# Patient Record
Sex: MALE | Race: BLACK OR AFRICAN AMERICAN | URBAN - METROPOLITAN AREA
[De-identification: names, ages, dates, MRNs, and addresses within clinical notes are randomized per-mention and may not be internally consistent; named-entity substitution may affect disease eponyms.]

---

## 2017-11-06 ENCOUNTER — APPOINTMENT (OUTPATIENT)
Dept: GENERAL RADIOLOGY | Facility: CLINIC | Age: 37
End: 2017-11-06
Attending: EMERGENCY MEDICINE
Payer: MEDICAID

## 2017-11-06 ENCOUNTER — APPOINTMENT (OUTPATIENT)
Dept: CARDIOLOGY | Facility: CLINIC | Age: 37
End: 2017-11-06
Attending: INTERNAL MEDICINE
Payer: MEDICAID

## 2017-11-06 ENCOUNTER — HOSPITAL ENCOUNTER (INPATIENT)
Facility: CLINIC | Age: 37
LOS: 2 days | Discharge: HOME | End: 2017-11-08
Attending: EMERGENCY MEDICINE | Admitting: INTERNAL MEDICINE
Payer: MEDICAID

## 2017-11-06 DIAGNOSIS — I25.10 CORONARY ARTERY DISEASE INVOLVING NATIVE CORONARY ARTERY OF NATIVE HEART WITHOUT ANGINA PECTORIS: ICD-10-CM

## 2017-11-06 DIAGNOSIS — I49.01 CARDIAC ARREST WITH VENTRICULAR FIBRILLATION (H): ICD-10-CM

## 2017-11-06 DIAGNOSIS — I21.11 ST ELEVATION MYOCARDIAL INFARCTION INVOLVING RIGHT CORONARY ARTERY (H): Primary | ICD-10-CM

## 2017-11-06 DIAGNOSIS — E78.5 HYPERLIPIDEMIA LDL GOAL <70: ICD-10-CM

## 2017-11-06 DIAGNOSIS — I46.9 CARDIAC ARREST WITH VENTRICULAR FIBRILLATION (H): ICD-10-CM

## 2017-11-06 LAB
ANION GAP SERPL CALCULATED.3IONS-SCNC: 14 MMOL/L (ref 3–14)
BASOPHILS # BLD AUTO: 0.1 10E9/L (ref 0–0.2)
BASOPHILS NFR BLD AUTO: 0.4 %
BUN SERPL-MCNC: 5 MG/DL (ref 7–30)
CALCIUM SERPL-MCNC: 8.6 MG/DL (ref 8.5–10.1)
CHLORIDE SERPL-SCNC: 106 MMOL/L (ref 94–109)
CHOLEST SERPL-MCNC: 220 MG/DL
CO2 SERPL-SCNC: 21 MMOL/L (ref 20–32)
CREAT SERPL-MCNC: 1.31 MG/DL (ref 0.66–1.25)
DIFFERENTIAL METHOD BLD: ABNORMAL
EOSINOPHIL # BLD AUTO: 0.1 10E9/L (ref 0–0.7)
EOSINOPHIL NFR BLD AUTO: 0.9 %
ERYTHROCYTE [DISTWIDTH] IN BLOOD BY AUTOMATED COUNT: 14.5 % (ref 10–15)
GFR SERPL CREATININE-BSD FRML MDRD: 62 ML/MIN/1.7M2
GLUCOSE BLDC GLUCOMTR-MCNC: 93 MG/DL (ref 70–99)
GLUCOSE BLDC GLUCOMTR-MCNC: 94 MG/DL (ref 70–99)
GLUCOSE SERPL-MCNC: 151 MG/DL (ref 70–99)
HCT VFR BLD AUTO: 49.3 % (ref 40–53)
HDLC SERPL-MCNC: 46 MG/DL
HGB BLD-MCNC: 16.9 G/DL (ref 13.3–17.7)
IMM GRANULOCYTES # BLD: 0 10E9/L (ref 0–0.4)
IMM GRANULOCYTES NFR BLD: 0.2 %
INTERPRETATION ECG - MUSE: NORMAL
KCT BLD-ACNC: 206 SEC (ref 105–167)
KCT BLD-ACNC: 230 SEC (ref 105–167)
LDLC SERPL CALC-MCNC: 137 MG/DL
LYMPHOCYTES # BLD AUTO: 2.5 10E9/L (ref 0.8–5.3)
LYMPHOCYTES NFR BLD AUTO: 20.5 %
MCH RBC QN AUTO: 29.8 PG (ref 26.5–33)
MCHC RBC AUTO-ENTMCNC: 34.3 G/DL (ref 31.5–36.5)
MCV RBC AUTO: 87 FL (ref 78–100)
MONOCYTES # BLD AUTO: 1.2 10E9/L (ref 0–1.3)
MONOCYTES NFR BLD AUTO: 9.7 %
NEUTROPHILS # BLD AUTO: 8.3 10E9/L (ref 1.6–8.3)
NEUTROPHILS NFR BLD AUTO: 68.3 %
NONHDLC SERPL-MCNC: 174 MG/DL
NRBC # BLD AUTO: 0 10*3/UL
NRBC BLD AUTO-RTO: 0 /100
PLATELET # BLD AUTO: 265 10E9/L (ref 150–450)
POTASSIUM SERPL-SCNC: 2.6 MMOL/L (ref 3.4–5.3)
POTASSIUM SERPL-SCNC: 4.1 MMOL/L (ref 3.4–5.3)
RBC # BLD AUTO: 5.67 10E12/L (ref 4.4–5.9)
SODIUM SERPL-SCNC: 141 MMOL/L (ref 133–144)
TRIGL SERPL-MCNC: 185 MG/DL
TROPONIN I SERPL-MCNC: 0.1 UG/L (ref 0–0.04)
TROPONIN I SERPL-MCNC: 1.67 UG/L (ref 0–0.04)
TROPONIN I SERPL-MCNC: 15.9 UG/L (ref 0–0.04)
WBC # BLD AUTO: 12.1 10E9/L (ref 4–11)

## 2017-11-06 PROCEDURE — 27210795 ZZH PAD DEFIB QUICK CR4

## 2017-11-06 PROCEDURE — 93458 L HRT ARTERY/VENTRICLE ANGIO: CPT | Mod: 26 | Performed by: INTERNAL MEDICINE

## 2017-11-06 PROCEDURE — 37799 UNLISTED PX VASCULAR SURGERY: CPT

## 2017-11-06 PROCEDURE — C1769 GUIDE WIRE: HCPCS

## 2017-11-06 PROCEDURE — C1874 STENT, COATED/COV W/DEL SYS: HCPCS

## 2017-11-06 PROCEDURE — B2151ZZ FLUOROSCOPY OF LEFT HEART USING LOW OSMOLAR CONTRAST: ICD-10-PCS | Performed by: INTERNAL MEDICINE

## 2017-11-06 PROCEDURE — 27210787 ZZH MANIFOLD CR2

## 2017-11-06 PROCEDURE — 27211089 ZZH KIT ACIST INJECTOR CR3

## 2017-11-06 PROCEDURE — 27211117 ZZH CARDIOVERT/DEFIB/PACER SUPP

## 2017-11-06 PROCEDURE — B2111ZZ FLUOROSCOPY OF MULTIPLE CORONARY ARTERIES USING LOW OSMOLAR CONTRAST: ICD-10-PCS | Performed by: INTERNAL MEDICINE

## 2017-11-06 PROCEDURE — 85025 COMPLETE CBC W/AUTO DIFF WBC: CPT | Performed by: EMERGENCY MEDICINE

## 2017-11-06 PROCEDURE — 36415 COLL VENOUS BLD VENIPUNCTURE: CPT | Performed by: INTERNAL MEDICINE

## 2017-11-06 PROCEDURE — 84484 ASSAY OF TROPONIN QUANT: CPT | Performed by: EMERGENCY MEDICINE

## 2017-11-06 PROCEDURE — 84132 ASSAY OF SERUM POTASSIUM: CPT | Performed by: INTERNAL MEDICINE

## 2017-11-06 PROCEDURE — 93010 ELECTROCARDIOGRAM REPORT: CPT | Mod: 76 | Performed by: INTERNAL MEDICINE

## 2017-11-06 PROCEDURE — C1887 CATHETER, GUIDING: HCPCS

## 2017-11-06 PROCEDURE — 92950 HEART/LUNG RESUSCITATION CPR: CPT

## 2017-11-06 PROCEDURE — 25000125 ZZHC RX 250: Performed by: INTERNAL MEDICINE

## 2017-11-06 PROCEDURE — 85347 COAGULATION TIME ACTIVATED: CPT

## 2017-11-06 PROCEDURE — 027034Z DILATION OF CORONARY ARTERY, ONE ARTERY WITH DRUG-ELUTING INTRALUMINAL DEVICE, PERCUTANEOUS APPROACH: ICD-10-PCS | Performed by: INTERNAL MEDICINE

## 2017-11-06 PROCEDURE — 80061 LIPID PANEL: CPT | Performed by: EMERGENCY MEDICINE

## 2017-11-06 PROCEDURE — 27210946 ZZH KIT HC TOTES DISP CR8

## 2017-11-06 PROCEDURE — 27210759 ZZH DEVICE INFLATION CR6

## 2017-11-06 PROCEDURE — 93005 ELECTROCARDIOGRAM TRACING: CPT

## 2017-11-06 PROCEDURE — 6A4Z1ZZ HYPOTHERMIA, MULTIPLE: ICD-10-PCS | Performed by: INTERNAL MEDICINE

## 2017-11-06 PROCEDURE — 25000128 H RX IP 250 OP 636: Performed by: INTERNAL MEDICINE

## 2017-11-06 PROCEDURE — 25000132 ZZH RX MED GY IP 250 OP 250 PS 637: Performed by: INTERNAL MEDICINE

## 2017-11-06 PROCEDURE — 93458 L HRT ARTERY/VENTRICLE ANGIO: CPT

## 2017-11-06 PROCEDURE — C9600 PERC DRUG-EL COR STENT SING: HCPCS | Mod: RC

## 2017-11-06 PROCEDURE — 71010 XR CHEST PORT 1 VW: CPT

## 2017-11-06 PROCEDURE — 99223 1ST HOSP IP/OBS HIGH 75: CPT | Mod: 25 | Performed by: INTERNAL MEDICINE

## 2017-11-06 PROCEDURE — C9460 INJECTION, CANGRELOR: HCPCS | Performed by: INTERNAL MEDICINE

## 2017-11-06 PROCEDURE — 84484 ASSAY OF TROPONIN QUANT: CPT | Performed by: INTERNAL MEDICINE

## 2017-11-06 PROCEDURE — 92941 PRQ TRLML REVSC TOT OCCL AMI: CPT | Mod: RC | Performed by: INTERNAL MEDICINE

## 2017-11-06 PROCEDURE — 99233 SBSQ HOSP IP/OBS HIGH 50: CPT | Performed by: ANESTHESIOLOGY

## 2017-11-06 PROCEDURE — 25000128 H RX IP 250 OP 636: Performed by: EMERGENCY MEDICINE

## 2017-11-06 PROCEDURE — 99285 EMERGENCY DEPT VISIT HI MDM: CPT | Mod: 25

## 2017-11-06 PROCEDURE — 20000003 ZZH R&B ICU

## 2017-11-06 PROCEDURE — 27211163 ZZH HYPOTHERMIC CATHETER/KIT CR19

## 2017-11-06 PROCEDURE — 4A023N7 MEASUREMENT OF CARDIAC SAMPLING AND PRESSURE, LEFT HEART, PERCUTANEOUS APPROACH: ICD-10-PCS | Performed by: INTERNAL MEDICINE

## 2017-11-06 PROCEDURE — 00000146 ZZHCL STATISTIC GLUCOSE BY METER IP

## 2017-11-06 PROCEDURE — 80048 BASIC METABOLIC PNL TOTAL CA: CPT | Performed by: EMERGENCY MEDICINE

## 2017-11-06 RX ORDER — ADENOSINE 3 MG/ML
12-12000 INJECTION, SOLUTION INTRAVENOUS
Status: DISCONTINUED | OUTPATIENT
Start: 2017-11-06 | End: 2017-11-06 | Stop reason: HOSPADM

## 2017-11-06 RX ORDER — BUPIVACAINE HYDROCHLORIDE 2.5 MG/ML
1-10 INJECTION, SOLUTION EPIDURAL; INFILTRATION; INTRACAUDAL
Status: DISCONTINUED | OUTPATIENT
Start: 2017-11-06 | End: 2017-11-06 | Stop reason: HOSPADM

## 2017-11-06 RX ORDER — METOPROLOL TARTRATE 1 MG/ML
5 INJECTION, SOLUTION INTRAVENOUS EVERY 5 MIN PRN
Status: DISCONTINUED | OUTPATIENT
Start: 2017-11-06 | End: 2017-11-06 | Stop reason: HOSPADM

## 2017-11-06 RX ORDER — METOPROLOL TARTRATE 25 MG/1
25 TABLET, FILM COATED ORAL 2 TIMES DAILY
Status: DISCONTINUED | OUTPATIENT
Start: 2017-11-06 | End: 2017-11-08 | Stop reason: HOSPADM

## 2017-11-06 RX ORDER — LIDOCAINE 40 MG/G
CREAM TOPICAL
Status: DISCONTINUED | OUTPATIENT
Start: 2017-11-06 | End: 2017-11-08 | Stop reason: HOSPADM

## 2017-11-06 RX ORDER — FUROSEMIDE 10 MG/ML
20-100 INJECTION INTRAMUSCULAR; INTRAVENOUS
Status: DISCONTINUED | OUTPATIENT
Start: 2017-11-06 | End: 2017-11-06 | Stop reason: HOSPADM

## 2017-11-06 RX ORDER — LIDOCAINE HYDROCHLORIDE 10 MG/ML
1-10 INJECTION, SOLUTION EPIDURAL; INFILTRATION; INTRACAUDAL; PERINEURAL
Status: COMPLETED | OUTPATIENT
Start: 2017-11-06 | End: 2017-11-06

## 2017-11-06 RX ORDER — NITROGLYCERIN 0.4 MG/1
0.4 TABLET SUBLINGUAL EVERY 5 MIN PRN
Status: DISCONTINUED | OUTPATIENT
Start: 2017-11-06 | End: 2017-11-06 | Stop reason: HOSPADM

## 2017-11-06 RX ORDER — ATORVASTATIN CALCIUM 40 MG/1
40 TABLET, FILM COATED ORAL DAILY
Status: DISCONTINUED | OUTPATIENT
Start: 2017-11-06 | End: 2017-11-08 | Stop reason: HOSPADM

## 2017-11-06 RX ORDER — HEPARIN SODIUM 1000 [USP'U]/ML
1000-10000 INJECTION, SOLUTION INTRAVENOUS; SUBCUTANEOUS EVERY 5 MIN PRN
Status: DISCONTINUED | OUTPATIENT
Start: 2017-11-06 | End: 2017-11-06 | Stop reason: HOSPADM

## 2017-11-06 RX ORDER — NICARDIPINE HYDROCHLORIDE 2.5 MG/ML
100 INJECTION INTRAVENOUS
Status: DISCONTINUED | OUTPATIENT
Start: 2017-11-06 | End: 2017-11-06 | Stop reason: HOSPADM

## 2017-11-06 RX ORDER — FENTANYL CITRATE 50 UG/ML
25-50 INJECTION, SOLUTION INTRAMUSCULAR; INTRAVENOUS
Status: DISPENSED | OUTPATIENT
Start: 2017-11-06 | End: 2017-11-07

## 2017-11-06 RX ORDER — SODIUM NITROPRUSSIDE 25 MG/ML
100-200 INJECTION INTRAVENOUS
Status: DISCONTINUED | OUTPATIENT
Start: 2017-11-06 | End: 2017-11-06 | Stop reason: HOSPADM

## 2017-11-06 RX ORDER — LORAZEPAM 0.5 MG/1
0.5 TABLET ORAL
Status: DISCONTINUED | OUTPATIENT
Start: 2017-11-06 | End: 2017-11-06 | Stop reason: HOSPADM

## 2017-11-06 RX ORDER — LIDOCAINE 40 MG/G
CREAM TOPICAL
Status: DISCONTINUED | OUTPATIENT
Start: 2017-11-06 | End: 2017-11-06 | Stop reason: HOSPADM

## 2017-11-06 RX ORDER — SODIUM CHLORIDE 9 MG/ML
INJECTION, SOLUTION INTRAVENOUS CONTINUOUS
Status: DISCONTINUED | OUTPATIENT
Start: 2017-11-06 | End: 2017-11-06 | Stop reason: HOSPADM

## 2017-11-06 RX ORDER — NALOXONE HYDROCHLORIDE 0.4 MG/ML
.1-.4 INJECTION, SOLUTION INTRAMUSCULAR; INTRAVENOUS; SUBCUTANEOUS
Status: DISCONTINUED | OUTPATIENT
Start: 2017-11-06 | End: 2017-11-08 | Stop reason: HOSPADM

## 2017-11-06 RX ORDER — ATROPINE SULFATE 0.1 MG/ML
0.5 INJECTION INTRAVENOUS EVERY 5 MIN PRN
Status: ACTIVE | OUTPATIENT
Start: 2017-11-06 | End: 2017-11-07

## 2017-11-06 RX ORDER — ASPIRIN 81 MG/1
81 TABLET ORAL DAILY
Status: DISCONTINUED | OUTPATIENT
Start: 2017-11-07 | End: 2017-11-08 | Stop reason: HOSPADM

## 2017-11-06 RX ORDER — EPINEPHRINE 1 MG/ML
0.3 INJECTION, SOLUTION, CONCENTRATE INTRAVENOUS
Status: DISCONTINUED | OUTPATIENT
Start: 2017-11-06 | End: 2017-11-06 | Stop reason: HOSPADM

## 2017-11-06 RX ORDER — FLUMAZENIL 0.1 MG/ML
0.2 INJECTION, SOLUTION INTRAVENOUS
Status: DISCONTINUED | OUTPATIENT
Start: 2017-11-06 | End: 2017-11-06 | Stop reason: HOSPADM

## 2017-11-06 RX ORDER — FLUMAZENIL 0.1 MG/ML
0.2 INJECTION, SOLUTION INTRAVENOUS
Status: ACTIVE | OUTPATIENT
Start: 2017-11-06 | End: 2017-11-07

## 2017-11-06 RX ORDER — ONDANSETRON 2 MG/ML
4 INJECTION INTRAMUSCULAR; INTRAVENOUS EVERY 4 HOURS PRN
Status: DISCONTINUED | OUTPATIENT
Start: 2017-11-06 | End: 2017-11-06 | Stop reason: HOSPADM

## 2017-11-06 RX ORDER — ASPIRIN 81 MG/1
81-324 TABLET, CHEWABLE ORAL
Status: DISCONTINUED | OUTPATIENT
Start: 2017-11-06 | End: 2017-11-06 | Stop reason: HOSPADM

## 2017-11-06 RX ORDER — HYDRALAZINE HYDROCHLORIDE 20 MG/ML
10-20 INJECTION INTRAMUSCULAR; INTRAVENOUS
Status: DISCONTINUED | OUTPATIENT
Start: 2017-11-06 | End: 2017-11-06 | Stop reason: HOSPADM

## 2017-11-06 RX ORDER — ENALAPRILAT 1.25 MG/ML
1.25-2.5 INJECTION INTRAVENOUS
Status: DISCONTINUED | OUTPATIENT
Start: 2017-11-06 | End: 2017-11-06 | Stop reason: HOSPADM

## 2017-11-06 RX ORDER — DIAZEPAM 5 MG
5 TABLET ORAL
Status: CANCELLED | OUTPATIENT
Start: 2017-11-06

## 2017-11-06 RX ORDER — NITROGLYCERIN 20 MG/100ML
.07-2 INJECTION INTRAVENOUS CONTINUOUS PRN
Status: DISCONTINUED | OUTPATIENT
Start: 2017-11-06 | End: 2017-11-06 | Stop reason: HOSPADM

## 2017-11-06 RX ORDER — MORPHINE SULFATE 2 MG/ML
1-2 INJECTION, SOLUTION INTRAMUSCULAR; INTRAVENOUS EVERY 5 MIN PRN
Status: DISCONTINUED | OUTPATIENT
Start: 2017-11-06 | End: 2017-11-06 | Stop reason: HOSPADM

## 2017-11-06 RX ORDER — ATROPINE SULFATE 0.1 MG/ML
.5-1 INJECTION INTRAVENOUS
Status: DISCONTINUED | OUTPATIENT
Start: 2017-11-06 | End: 2017-11-06 | Stop reason: HOSPADM

## 2017-11-06 RX ORDER — PHENYLEPHRINE HCL IN 0.9% NACL 1 MG/10 ML
20-100 SYRINGE (ML) INTRAVENOUS
Status: DISCONTINUED | OUTPATIENT
Start: 2017-11-06 | End: 2017-11-06 | Stop reason: HOSPADM

## 2017-11-06 RX ORDER — DEXTROSE MONOHYDRATE 25 G/50ML
12.5-5 INJECTION, SOLUTION INTRAVENOUS EVERY 30 MIN PRN
Status: DISCONTINUED | OUTPATIENT
Start: 2017-11-06 | End: 2017-11-06 | Stop reason: HOSPADM

## 2017-11-06 RX ORDER — FENTANYL CITRATE 50 UG/ML
25-50 INJECTION, SOLUTION INTRAMUSCULAR; INTRAVENOUS
Status: DISCONTINUED | OUTPATIENT
Start: 2017-11-06 | End: 2017-11-06 | Stop reason: HOSPADM

## 2017-11-06 RX ORDER — PROTAMINE SULFATE 10 MG/ML
1-5 INJECTION, SOLUTION INTRAVENOUS
Status: DISCONTINUED | OUTPATIENT
Start: 2017-11-06 | End: 2017-11-06 | Stop reason: HOSPADM

## 2017-11-06 RX ORDER — NALOXONE HYDROCHLORIDE 0.4 MG/ML
.2-.4 INJECTION, SOLUTION INTRAMUSCULAR; INTRAVENOUS; SUBCUTANEOUS
Status: ACTIVE | OUTPATIENT
Start: 2017-11-06 | End: 2017-11-07

## 2017-11-06 RX ORDER — PROMETHAZINE HYDROCHLORIDE 25 MG/ML
6.25-25 INJECTION, SOLUTION INTRAMUSCULAR; INTRAVENOUS EVERY 4 HOURS PRN
Status: DISCONTINUED | OUTPATIENT
Start: 2017-11-06 | End: 2017-11-06 | Stop reason: HOSPADM

## 2017-11-06 RX ORDER — LIDOCAINE HYDROCHLORIDE 10 MG/ML
30 INJECTION, SOLUTION EPIDURAL; INFILTRATION; INTRACAUDAL; PERINEURAL
Status: DISCONTINUED | OUTPATIENT
Start: 2017-11-06 | End: 2017-11-06 | Stop reason: HOSPADM

## 2017-11-06 RX ORDER — NITROGLYCERIN 5 MG/ML
100-200 VIAL (ML) INTRAVENOUS
Status: DISCONTINUED | OUTPATIENT
Start: 2017-11-06 | End: 2017-11-06 | Stop reason: HOSPADM

## 2017-11-06 RX ORDER — POTASSIUM CHLORIDE 7.45 MG/ML
10 INJECTION INTRAVENOUS
Status: DISCONTINUED | OUTPATIENT
Start: 2017-11-06 | End: 2017-11-06 | Stop reason: HOSPADM

## 2017-11-06 RX ORDER — LORAZEPAM 2 MG/ML
0.5 INJECTION INTRAMUSCULAR
Status: DISCONTINUED | OUTPATIENT
Start: 2017-11-06 | End: 2017-11-06 | Stop reason: HOSPADM

## 2017-11-06 RX ORDER — DOPAMINE HYDROCHLORIDE 160 MG/100ML
2-20 INJECTION, SOLUTION INTRAVENOUS CONTINUOUS PRN
Status: DISCONTINUED | OUTPATIENT
Start: 2017-11-06 | End: 2017-11-06 | Stop reason: HOSPADM

## 2017-11-06 RX ORDER — NITROGLYCERIN 5 MG/ML
100-500 VIAL (ML) INTRAVENOUS
Status: DISCONTINUED | OUTPATIENT
Start: 2017-11-06 | End: 2017-11-06 | Stop reason: HOSPADM

## 2017-11-06 RX ORDER — HEPARIN SODIUM 1000 [USP'U]/ML
4000 INJECTION, SOLUTION INTRAVENOUS; SUBCUTANEOUS ONCE
Status: COMPLETED | OUTPATIENT
Start: 2017-11-06 | End: 2017-11-06

## 2017-11-06 RX ORDER — HEPARIN SODIUM 1000 [USP'U]/ML
INJECTION, SOLUTION INTRAVENOUS; SUBCUTANEOUS
Status: DISCONTINUED
Start: 2017-11-06 | End: 2017-11-06 | Stop reason: HOSPADM

## 2017-11-06 RX ORDER — METHYLPREDNISOLONE SODIUM SUCCINATE 125 MG/2ML
125 INJECTION, POWDER, LYOPHILIZED, FOR SOLUTION INTRAMUSCULAR; INTRAVENOUS
Status: DISCONTINUED | OUTPATIENT
Start: 2017-11-06 | End: 2017-11-06 | Stop reason: HOSPADM

## 2017-11-06 RX ORDER — DIPHENHYDRAMINE HYDROCHLORIDE 50 MG/ML
25-50 INJECTION INTRAMUSCULAR; INTRAVENOUS
Status: DISCONTINUED | OUTPATIENT
Start: 2017-11-06 | End: 2017-11-06 | Stop reason: HOSPADM

## 2017-11-06 RX ORDER — NALOXONE HYDROCHLORIDE 0.4 MG/ML
0.4 INJECTION, SOLUTION INTRAMUSCULAR; INTRAVENOUS; SUBCUTANEOUS EVERY 5 MIN PRN
Status: DISCONTINUED | OUTPATIENT
Start: 2017-11-06 | End: 2017-11-06 | Stop reason: HOSPADM

## 2017-11-06 RX ORDER — ASPIRIN 325 MG
325 TABLET ORAL
Status: DISCONTINUED | OUTPATIENT
Start: 2017-11-06 | End: 2017-11-06 | Stop reason: HOSPADM

## 2017-11-06 RX ORDER — DOBUTAMINE HYDROCHLORIDE 200 MG/100ML
2-20 INJECTION INTRAVENOUS CONTINUOUS PRN
Status: DISCONTINUED | OUTPATIENT
Start: 2017-11-06 | End: 2017-11-06 | Stop reason: HOSPADM

## 2017-11-06 RX ORDER — HYDROCODONE BITARTRATE AND ACETAMINOPHEN 5; 325 MG/1; MG/1
1-2 TABLET ORAL EVERY 4 HOURS PRN
Status: DISCONTINUED | OUTPATIENT
Start: 2017-11-06 | End: 2017-11-08 | Stop reason: HOSPADM

## 2017-11-06 RX ORDER — ACETAMINOPHEN 325 MG/1
325-650 TABLET ORAL EVERY 4 HOURS PRN
Status: DISCONTINUED | OUTPATIENT
Start: 2017-11-06 | End: 2017-11-08 | Stop reason: HOSPADM

## 2017-11-06 RX ORDER — NIFEDIPINE 10 MG/1
10 CAPSULE ORAL
Status: DISCONTINUED | OUTPATIENT
Start: 2017-11-06 | End: 2017-11-06 | Stop reason: HOSPADM

## 2017-11-06 RX ORDER — PRASUGREL 10 MG/1
10-60 TABLET, FILM COATED ORAL
Status: DISCONTINUED | OUTPATIENT
Start: 2017-11-06 | End: 2017-11-06 | Stop reason: HOSPADM

## 2017-11-06 RX ORDER — POTASSIUM CHLORIDE 29.8 MG/ML
20 INJECTION INTRAVENOUS
Status: COMPLETED | OUTPATIENT
Start: 2017-11-06 | End: 2017-11-06

## 2017-11-06 RX ORDER — POTASSIUM CHLORIDE 1500 MG/1
20 TABLET, EXTENDED RELEASE ORAL
Status: DISCONTINUED | OUTPATIENT
Start: 2017-11-06 | End: 2017-11-06 | Stop reason: HOSPADM

## 2017-11-06 RX ORDER — NITROGLYCERIN 0.4 MG/1
0.4 TABLET SUBLINGUAL EVERY 5 MIN PRN
Status: DISCONTINUED | OUTPATIENT
Start: 2017-11-06 | End: 2017-11-08 | Stop reason: HOSPADM

## 2017-11-06 RX ORDER — SODIUM CHLORIDE 9 MG/ML
INJECTION, SOLUTION INTRAVENOUS CONTINUOUS
Status: ACTIVE | OUTPATIENT
Start: 2017-11-06 | End: 2017-11-06

## 2017-11-06 RX ORDER — PROTAMINE SULFATE 10 MG/ML
25-100 INJECTION, SOLUTION INTRAVENOUS EVERY 5 MIN PRN
Status: DISCONTINUED | OUTPATIENT
Start: 2017-11-06 | End: 2017-11-06 | Stop reason: HOSPADM

## 2017-11-06 RX ORDER — CLOPIDOGREL BISULFATE 75 MG/1
75 TABLET ORAL
Status: DISCONTINUED | OUTPATIENT
Start: 2017-11-06 | End: 2017-11-06 | Stop reason: HOSPADM

## 2017-11-06 RX ORDER — VERAPAMIL HYDROCHLORIDE 2.5 MG/ML
1-2.5 INJECTION, SOLUTION INTRAVENOUS
Status: DISCONTINUED | OUTPATIENT
Start: 2017-11-06 | End: 2017-11-06 | Stop reason: HOSPADM

## 2017-11-06 RX ORDER — CLOPIDOGREL 300 MG/1
300-600 TABLET, FILM COATED ORAL
Status: DISCONTINUED | OUTPATIENT
Start: 2017-11-06 | End: 2017-11-06 | Stop reason: HOSPADM

## 2017-11-06 RX ORDER — POTASSIUM CHLORIDE 7.45 MG/ML
10 INJECTION INTRAVENOUS
Status: COMPLETED | OUTPATIENT
Start: 2017-11-06 | End: 2017-11-06

## 2017-11-06 RX ORDER — LORAZEPAM 2 MG/ML
.5-2 INJECTION INTRAMUSCULAR EVERY 4 HOURS PRN
Status: DISCONTINUED | OUTPATIENT
Start: 2017-11-06 | End: 2017-11-06 | Stop reason: HOSPADM

## 2017-11-06 RX ADMIN — ATORVASTATIN CALCIUM 40 MG: 40 TABLET, FILM COATED ORAL at 17:34

## 2017-11-06 RX ADMIN — TICAGRELOR 180 MG: 90 TABLET ORAL at 14:26

## 2017-11-06 RX ADMIN — MIDAZOLAM HYDROCHLORIDE 4 MG: 1 INJECTION, SOLUTION INTRAMUSCULAR; INTRAVENOUS at 12:43

## 2017-11-06 RX ADMIN — Medication 4000 UNITS: at 11:59

## 2017-11-06 RX ADMIN — Medication 2000 UNITS: at 13:01

## 2017-11-06 RX ADMIN — SODIUM CHLORIDE: 9 INJECTION, SOLUTION INTRAVENOUS at 14:11

## 2017-11-06 RX ADMIN — MIDAZOLAM HYDROCHLORIDE 2 MG: 1 INJECTION, SOLUTION INTRAMUSCULAR; INTRAVENOUS at 16:16

## 2017-11-06 RX ADMIN — POTASSIUM CHLORIDE 20 MEQ: 400 INJECTION, SOLUTION INTRAVENOUS at 13:00

## 2017-11-06 RX ADMIN — LIDOCAINE HYDROCHLORIDE 10 MG: 10 INJECTION, SOLUTION EPIDURAL; INFILTRATION; INTRACAUDAL; PERINEURAL at 12:09

## 2017-11-06 RX ADMIN — POTASSIUM CHLORIDE 10 MEQ: 7.46 INJECTION, SOLUTION INTRAVENOUS at 12:44

## 2017-11-06 RX ADMIN — CANGRELOR 4 MCG/KG/MIN: 50 INJECTION, POWDER, LYOPHILIZED, FOR SOLUTION INTRAVENOUS at 12:35

## 2017-11-06 RX ADMIN — MIDAZOLAM HYDROCHLORIDE 2 MG: 1 INJECTION, SOLUTION INTRAMUSCULAR; INTRAVENOUS at 12:08

## 2017-11-06 RX ADMIN — NITROGLYCERIN 0.4 MG: 0.4 TABLET SUBLINGUAL at 14:50

## 2017-11-06 RX ADMIN — Medication 3000 UNITS: at 12:45

## 2017-11-06 RX ADMIN — TICAGRELOR 90 MG: 90 TABLET ORAL at 21:42

## 2017-11-06 RX ADMIN — FENTANYL CITRATE 25 MCG: 50 INJECTION, SOLUTION INTRAMUSCULAR; INTRAVENOUS at 16:16

## 2017-11-06 ASSESSMENT — ACTIVITIES OF DAILY LIVING (ADL)
TOILETING: 0-->INDEPENDENT
DRESS: 0-->INDEPENDENT
RETIRED_EATING: 0-->INDEPENDENT
FALL_HISTORY_WITHIN_LAST_SIX_MONTHS: NO
AMBULATION: 0-->INDEPENDENT
RETIRED_COMMUNICATION: 0-->UNDERSTANDS/COMMUNICATES WITHOUT DIFFICULTY
BATHING: 0-->INDEPENDENT
COGNITION: 0 - NO COGNITION ISSUES REPORTED
TRANSFERRING: 0-->INDEPENDENT
SWALLOWING: 0-->SWALLOWS FOODS/LIQUIDS WITHOUT DIFFICULTY

## 2017-11-06 ASSESSMENT — ENCOUNTER SYMPTOMS
CHEST TIGHTNESS: 1
FATIGUE: 0

## 2017-11-06 NOTE — IP AVS SNAPSHOT
[facility]    [address] [place] [address], [address]    [place] [address]    Phone:  [phone]                                       After Visit Summary   11/6/2017    Clark Canales    MRN: [id number]           After Visit Summary Signature Page     I have received my discharge instructions, and my questions have been answered. I have discussed any challenges I see with this plan with the nurse or doctor.    ..........................................................................................................................................  Patient/Patient Representative Signature      ..........................................................................................................................................  Patient Representative Print Name and Relationship to Patient    ..................................................               ................................................  Date                                            Time    ..........................................................................................................................................  Reviewed by Signature/Title    ...................................................              ..............................................  Date                                                            Time

## 2017-11-06 NOTE — PROGRESS NOTES
Patient seen in ICU. Completely awake and responding appropriately. Recalls chest pain while working but nothing else. Has risk factors of family history and tobacco abuse. Plan for CMR and cardiac rehab tomorrow.

## 2017-11-06 NOTE — CONSULTS
Cardiology Consultation      Clark Canales MRN# [id number]   YOB: 1980 Age: 36 year old   Date of Admission: 2017     Reason for consult:            Assessment and Plan:     1) VF arrest now in NSR  2) Inferior STEMI    Plan  - coronary angio. Patient is not awake enough to provide consent but I feel it is medically necessary to proceed             Chief Complaint:   Cardiac Arrest (pt at work with CP given asa and ntg at scene vfib in rig shocked  x 1 return of pulse, pt arrived obtuned with spont resp, ST elevation)           History of Present Illness:   This patient is a 36 year old male who was at work today when he started experiencing anterior SS chest discomfort. EMS were contacted and gave him NTG and ASA after which he had VF requiring a shock x 1. Post shock EKG demonstrated inferior ST elevation.  Post shock the patient appeared drowsy but was  Responsive to questions. He did complain of chest pain. Prior to the arrest he denied any significant cardiac history or meds.         Physical Exam:   Vitals were reviewed  Blood pressure 113/79, temperature 97  F (36.1  C), temperature source Temporal, resp. rate 18, weight 102.5 kg (225 lb 15.5 oz), SpO2 98 %.  Temperatures:  Current - Temp: 97  F (36.1  C); Max - Temp  Av  F (36.1  C)  Min: 97  F (36.1  C)  Max: 97  F (36.1  C)  Respiration range: Resp  Av  Min: 18  Max: 18  Pulse range: No Data Recorded  Blood pressure range: Systolic (24hrs), Av , Min:113 , Max:113   ; Diastolic (24hrs), Av, Min:79, Max:79    Pulse oximetry range: SpO2  Av %  Min: 98 %  Max: 98 %  No intake or output data in the 24 hours ending 17 1156  Constitutional:   Drowsy but responsive     Eyes:   Lids and lashes normal, pupils equal, round and reactive to light, extra ocular muscles intact, sclera clear, conjunctiva normal     Neck:   supple, symmetrical, trachea midline, no JVD     Back:   symmetric     Lungs:   No increased work of  breathing, good air exchange, clear to auscultation bilaterally, no crackles or wheezing     Cardiovascular:   Normal apical impulse, regular rate and rhythm, normal S1 and S2, no S3 or S4, and no murmur noted.      Abdomen:   non-tender     Musculoskeletal:   motor strength is 5 out of 5 all extremities bilaterally     Neurologic:   Grossly nonfocal     Skin:   no bruising or bleeding     Additional findings:     No edema               Past Medical History:   I have reviewed this patient's past medical history    No CAD            Past Surgical History:   I have reviewed this patient's past surgical history  No past surgical history on file.            Social History:   I have reviewed this patient's social history  Social History   Substance Use Topics     Smoking status: Not on file     Smokeless tobacco: Not on file     Alcohol use Not on file             Family History:   I have reviewed this patient's family history    FH +matthew for CAD          Allergies:   No Known Allergies          Medications:   I have reviewed this patient's current medications    (Not in a hospital admission)  Current Facility-Administered Medications Ordered in Epic   Medication Dose Route Frequency Last Rate Last Dose     heparin (porcine) 1000 UNIT/ML injection             No current Epic-ordered outpatient prescriptions on file.             Review of Systems:   The 10 point Review of Systems is negative other than noted in the HPI            Data:   All laboratory data reviewed  Results for orders placed or performed during the hospital encounter of 11/06/17 (from the past 24 hour(s))   CBC with platelets differential   Result Value Ref Range    WBC 12.1 (H) 4.0 - 11.0 10e9/L    RBC Count 5.67 4.4 - 5.9 10e12/L    Hemoglobin 16.9 13.3 - 17.7 g/dL    Hematocrit 49.3 40.0 - 53.0 %    MCV 87 78 - 100 fl    MCH 29.8 26.5 - 33.0 pg    MCHC 34.3 31.5 - 36.5 g/dL    RDW 14.5 10.0 - 15.0 %    Platelet Count 265 150 - 450 10e9/L    Diff Method  Automated Method     % Neutrophils 68.3 %    % Lymphocytes 20.5 %    % Monocytes 9.7 %    % Eosinophils 0.9 %    % Basophils 0.4 %    % Immature Granulocytes 0.2 %    Nucleated RBCs 0 0 /100    Absolute Neutrophil 8.3 1.6 - 8.3 10e9/L    Absolute Lymphocytes 2.5 0.8 - 5.3 10e9/L    Absolute Monocytes 1.2 0.0 - 1.3 10e9/L    Absolute Eosinophils 0.1 0.0 - 0.7 10e9/L    Absolute Basophils 0.1 0.0 - 0.2 10e9/L    Abs Immature Granulocytes 0.0 0 - 0.4 10e9/L    Absolute Nucleated RBC 0.0    EKG 12 lead   Result Value Ref Range    Interpretation ECG Click View Image link to view waveform and result      EKG results: NSR with inferior ST elevation

## 2017-11-06 NOTE — ED NOTES
Bed: ST02  Expected date:   Expected time:   Means of arrival:   Comments:  512 36m c arrest shock X1 return of pulse

## 2017-11-06 NOTE — PHARMACY-ADMISSION MEDICATION HISTORY
Admission medication history interview status for the 11/6/2017  admission is complete. See EPIC admission navigator for prior to admission medications     Medication history source reliability:Good    Actions taken by pharmacist (provider contacted, etc): Pt states he takes no medications, supplements etc. Last time he took Rx meds for a dental problem in May but that has resolved.      Additional medication history information not noted on PTA med list :None    Medication reconciliation/reorder completed by provider prior to medication history? No    Time spent in this activity: 10 minutes    Prior to Admission medications    Not on File       Kira Hernandez, PharmD

## 2017-11-06 NOTE — PROCEDURES
Successful stenting of ruptured plaque in proximal RCA with 3.5x20mm Promus Premier JOSIAS leaving a 0% residual with LUCY three flow.  25% mid LAD.   LVEDP 12 mmHg EF 55%    Rec Cangrelor until he can take Brilinta  ASA 81 daily  Atorvastatin 40.  Check FLP  Beta blocker and Ace I as tolerated  Hypothermic protocol? Pt appears to be waking up.

## 2017-11-06 NOTE — CONSULTS
Received standard consult to instruct patient on AHA diet guidelines post angio.  Patient just returned from cath lab and not appropriate for diet education at this time.  Will see once he is transferred out of ICU.    Ivis Niño RD, LD, [facility]   Clinical Dietitian - [facility]

## 2017-11-06 NOTE — PROGRESS NOTES
Critical Care  Note      11/06/2017    Name: Clark Canales MRN#: [id number]   Age: 36 year old YOB: 1980     Hsptl Day# 0  ICU DAY #    MV DAY #             Problem List:   Active Problems:    * No active hospital problems. *           Summary/Hospital Course:   35 yo male presents from cath lab after stenting of right RCA.  Had chest pain while at work followed by v fib arrest.  He is currently awake alert following commands    ROS Non contributory    Family history MI in uncle and grandfather late onset        Assessment and plan :     Clark Canales IS a 36 year old male admitted on 11/6/2017 for ACS s/p stenting of RCA.   I have personally reviewed the daily labs, imaging studies, cultures and discussed the case with referring physician and consulting physicians.     My assessment and plan by system for this patient is as follows:    Neurology/Psychiatry:   1. Concern for AMS  2. Analgesia  3. Anxiety  Plan  Mental status improving no interventions at this time    Cardiovascular:   1.Hemodynamics -stable  2.Rhythm - RRR  3. Ischemia - s/p stenting  Plan  Continue monitoring, nitro PRN Transition from Brilinta to Cangrelor as per cards rec    Pulmonary/Ventilator Management:   1. Airway patent native airway  2. Oxygenation/ventilation/mechanics  Plan  Encourage Incentive spirometry    GI and Nutrition :   1. Advance diet    Plan  -advance diet as tolerated    Renal/Fluids/Electrolytes:   1. No evidence of ARSH  2. Electrolyte abnormality hypokalemia, will replete  3. Acid/base status  4. Volume status euvolemic  Plan  - monitor I/O  - monitor function and electrolytes as needed with replacement per ICU protocols. - generally avoid nephrotoxic agents such as NSAID, IV contrast unless specifically required  - adjust medications as needed for renal clearance  - follow I/O's as appropriate.    Infectious Disease:   1.no active issues      Endocrine:   1. Monitor blood sugar           IV/Access:   1.  Venous access -  Groin line  2. Arterial access - Groin line  3.  Plan  - central access required and necessary, however will remove lines once ACT corrected      ICU Prophylaxis:   1. DVT: SCD  2. VAP: not intubated  3. Stress Ulcer: not intubated  4. Restraints: Not indicated  5. Wound care  Local wound care at groin sites  6. Feeding - advance diet  7. Family Update:discussed with patient  8. Disposition - ICU overnight, possible transfer in am        Key goals for next 24 hours:   1.Cardiac monitroing  2.Remove femoral lines  3.advance diet               Medical History:     No past medical history on file.  No past surgical history on file.  Social History     Social History     Marital status: Single     Spouse name: N/A     Number of children: N/A     Years of education: N/A     Occupational History     Not on file.     Social History Main Topics     Smoking status: Not on file     Smokeless tobacco: Not on file     Alcohol use Not on file     Drug use: Not on file     Sexual activity: Not on file     Other Topics Concern     Not on file     Social History Narrative      No Known Allergies           Key Medications:       sodium chloride (PF)  3 mL Intracatheter Q8H     [START ON 11/7/2017] aspirin EC  81 mg Oral Daily     ticagrelor  90 mg Oral Q12H     metoprolol  25 mg Oral BID     atorvastatin  40 mg Oral Daily     [START ON 11/7/2017] influenza quadrivalent (PF) vacc age 3 yrs and older  0.5 mL Intramuscular Prior to discharge     [START ON 11/7/2017] pneumococcal vaccine  0.5 mL Intramuscular Prior to discharge       cangrelor (KENGREAL) infusion ADULT 4 mcg/kg/min (11/06/17 1235)     NaCl 75 mL/hr at 11/06/17 1411     Reason ACE/ARB/ARNI order not selected          Home Meds    No current facility-administered medications on file prior to encounter.   No current outpatient prescriptions on file prior to encounter.           Physical Examination:   Temp:  [97  F (36.1  C)-98.8  F (37.1  C)] 98.8  F  (37.1  C)  Heart Rate:  [53-93] 53  Resp:  [8-19] 19  BP: (102-138)/(77-97) 102/77  SpO2:  [98 %-100 %] 100 %    Intake/Output Summary (Last 24 hours) at 11/06/17 1551  Last data filed at 11/06/17 1500   Gross per 24 hour   Intake           161.25 ml   Output              275 ml   Net          -113.75 ml     Wt Readings from Last 4 Encounters:   11/06/17 102.5 kg (225 lb 15.5 oz)     BP - Mean:  [] 86  Resp: 19  No lab results found in last 7 days.    GEN: no acute distress   HEENT: head ncat, sclera anicteric, OP patent, trachea midline   PULM:  clear anteriorly    CV/COR: RRR S1S2 no gallop,  No rub, no murmur  ABD: soft nontender, hypoactive bowel sounds, no mass  EXT:  Edema   warm  NEURO: grossly intact  SKIN: no obvious rash  LINES: clean, dry intact         Data:   All data and imaging reviewed     ROUTINE ICU LABS (Last four results)  CMP  Recent Labs  Lab 11/06/17  1140      POTASSIUM 2.6*   CHLORIDE 106   CO2 21   ANIONGAP 14   *   BUN 5*   CR 1.31*   GFRESTIMATED 62   GFRESTBLACK 75   KAITLYNN 8.6     CBC  Recent Labs  Lab 11/06/17  1140   WBC 12.1*   RBC 5.67   HGB 16.9   HCT 49.3   MCV 87   MCH 29.8   MCHC 34.3   RDW 14.5        INRNo lab results found in last 7 days.  Arterial Blood GasNo lab results found in last 7 days.    All cultures:  No results for input(s): CULT in the last 168 hours.  Recent Results (from the past 24 hour(s))   XR Chest Port 1 View    Narrative    XR CHEST PORT 1 VW  11/6/2017 11:54 AM    HISTORY:  cardiac arrest    COMPARISON:  None.      Impression    IMPRESSION:  Monitoring device partially obscures the left upper and  lower medial hemithorax. Otherwise negative.     BIB TINOCO MD         Billing: This patient is critically ill: Yes Total critical care time today 31 min.excluding procedures

## 2017-11-06 NOTE — IP AVS SNAPSHOT
MRN:[id number]                      After Visit Summary   11/6/2017    Clark Canales    MRN: [id number]           Thank you!     Thank you for choosing [facility] for your care. Our goal is always to provide you with excellent care. Hearing back from our patients is one way we can continue to improve our services. Please take a few minutes to complete the written survey that you may receive in the mail after you visit with us. Thank you!        Patient Information     Date Of Birth          1980        Designated Caregiver       Most Recent Value    Caregiver    Will someone help with your care after discharge? yes    Name of designated caregiver Miya Downey    Phone number of caregiver [phone]    Caregiver address at pt's home      About your hospital stay     You were admitted on:  November 6, 2017 You last received care in the:  [facility]    You were discharged on:  November 8, 2017       Who to Call     For medical emergencies, please call 911.  For non-urgent questions about your medical care, please call your primary care provider or clinic, None          Attending Provider     Provider Specialty    Xavier Narayan MD Emergency Medicine    Ali, Julio Patton MD Cardiology       Primary Care Provider    Physician No Ref-Primary      Your next 10 appointments already scheduled     Nov 16, 2017  1:30 PM CST   Return Discharge with CHINA Almazan CNP   [facility] ([facility])    [address] [place] [id number] [phone]              Additional Services     CARDIAC REHAB REFERRAL       Patient may choose their preference of the site for Cardiac Rehab.            Follow-Up with Cardiac Advanced Practice Provider           Follow-Up with Cardiologist                 Future tests that were ordered for you     ALT       Last Lab Result: No results found for: ALT             Lipid Profile                 Further instructions from your care team       Cardiac Angioplasty/Stent Discharge Instructions - Femoral    After you go home:      Have an adult stay with you until tomorrow.    Drink extra fluids for 2 days.    You may resume your normal diet.    No smoking       For 24 hours - due to the sedation you received:    Relax and take it easy.    Do NOT make any important or legal decisions.    Do NOT drive or operate machines at home or at work.    Do NOT drink alcohol.    Care of Groin Puncture Site:      For the first 24 hrs - check the puncture site every 1-2 hours while awake.    For 2 days, when you cough, sneeze, laugh or move your bowels, hold your hand over the puncture site and press firmly.    Remove the bandaid after 24 hours. If there is minor oozing, apply another bandaid and remove it after 12 hours.    It is normal to have a small bruise or pea size lump at the site.    You may shower tomorrow. Do NOT take a bath, or use a hot tub or pool for at least 3 days. Do NOT scrub the site. Do not use lotion or powder near the puncture site.     Activity:            For 2 days:    No stooping or squatting    Do NOT do any heavy activity such as exercise, lifting, or straining.     No housework, yard work or any activity that make you sweat    Do NOT lift more than 10 pounds    Bleeding:      If you start bleeding from the site in your groin, lie down flat and press firmly on/above the site for 10 minutes.     Once bleeding stops, lay flat for 2 hours.     Call [facility] as soon as you can.       Call 911 right away if you have heavy bleeding or bleeding that does not stop.      Medicines:      If you are taking antiplatelet medications such as Plavix, Brilinta or Effient, do not stop taking it until you talk to your cardiologist.        If you are on Metformin (Glucophage), do not restart it until you have blood tests (within 2 to 3 days after discharge).  After you have  your blood drawn, you may restart the Metformin.     Take your medications, including blood thinners, unless your provider tells you not to.  If you take Coumadin (Warfarin), have your INR checked by your provider in  3-5 days. Call your clinic to schedule this.    If you have stopped any medicines, check with your provider about when to restart them.    Follow Up Appointments:      Follow up with [facility] Nurse Practitioner at [facility] of patient preference in 7-10 days.    Cardiac Rehab will contact you for follow up care.    Call the clinic if:      You have increased pain or a large or growing hard lump around the site.    The site is red, swollen, hot or tender.    Blood or fluid is draining from the site.    You have chills or a fever greater than 101 F (38 C).    Your leg turns feels numb, cool or changes color.    You have hives, a rash or unusual itching.    New pain in the back or belly that you cannot control with Tylenol.    Any questions or concerns.      Other Instructions:      If you received a stent - carry your stent card with you at all times.      [facility] [place]:    [phone] [facility] (7 days a week)          Pending Results     Date and Time Order Name Status Description    11/6/2017 1527 EKG 12-lead, tracing only Preliminary     11/6/2017 1355 EKG 12-lead, tracing only  Preliminary             Statement of Approval     Ordered          11/08/17 1118  I have reviewed and agree with all the recommendations and orders detailed in this document.  EFFECTIVE NOW     Approved and electronically signed by:  Stefania Blackmon APRN CNP             Admission Information     Date & Time Provider Department Dept. Phone    11/6/2017 Julio Chaudhry MD [facility] [phone]      Your Vitals Were     Blood Pressure Pulse Temperature Respirations Weight Pulse Oximetry    108/48 (BP Location: Right arm) 52 98.3  F (36.8  C)  "(Oral) 16 101.8 kg (224 lb 6.9 oz) 99%      MyChart Information     [employer] lets you send messages to your doctor, view your test results, renew your prescriptions, schedule appointments and more. To sign up, go to [email or web address] . Click on \"Log in\" on the left side of the screen, which will take you to the Welcome page. Then click on \"Sign up Now\" on the right side of the page.     You will be asked to enter the access code listed below, as well as some personal information. Please follow the directions to create your username and password.     Your access code is: [email or web address]  Expires: 2018  2:03 PM     Your access code will  in 90 days. If you need help or a new code, please call your [facility] clinic or [phone].        Care EveryWhere ID     This is your Care EveryWhere ID. This could be used by other organizations to access your [facility] medical records  [id number]        Equal Access to Services     [facility] : Hadconstantin Calvo, waaxda luchel, qaybta kaalsunita castro, eric vasquez . So [facility] [phone].    ATENCIÓN: Si habla español, tiene a coyle disposición servicios gratuitos de asistencia lingüística. Llame al [phone].    We comply with applicable federal civil rights laws and [place] laws. We do not discriminate on the basis of race, color, national origin, age, disability, sex, sexual orientation, or gender identity.               Review of your medicines      START taking        Dose / Directions    aspirin 81 MG EC tablet   Used for:  ST elevation myocardial infarction involving right coronary artery (H)        Dose:  81 mg   Take 1 tablet (81 mg) by mouth daily   Refills:  0       atorvastatin 40 MG tablet   Commonly known as:  LIPITOR   Used for:  Hyperlipidemia LDL goal <70        Dose:  40 mg   Take 1 tablet (40 mg) by mouth daily   Quantity:  30 tablet   Refills:  11       metoprolol 25 MG tablet   Commonly known as:  " LOPRESSOR   Used for:  Coronary artery disease involving native coronary artery of native heart without angina pectoris        Dose:  25 mg   Take 1 tablet (25 mg) by mouth 2 times daily   Quantity:  60 tablet   Refills:  11       nitroGLYcerin 0.4 MG sublingual tablet   Commonly known as:  NITROSTAT   Used for:  ST elevation myocardial infarction involving right coronary artery (H)        For chest pain place 1 tablet under the tongue every 5 minutes for 3 doses. If symptoms persist 5 minutes after 1st dose call 911.   Quantity:  25 tablet   Refills:  3       ticagrelor 90 MG tablet   Commonly known as:  BRILINTA   Used for:  ST elevation myocardial infarction involving right coronary artery (H)        Dose:  90 mg   Take 1 tablet (90 mg) by mouth every 12 hours   Quantity:  60 tablet   Refills:  11            Where to get your medicines      These medications were sent to [facility] - [address]  [address], [place] [address]     Phone:  [phone]     atorvastatin 40 MG tablet    metoprolol 25 MG tablet    nitroGLYcerin 0.4 MG sublingual tablet    ticagrelor 90 MG tablet         Some of these will need a paper prescription and others can be bought over the counter. Ask your nurse if you have questions.     You don't need a prescription for these medications     aspirin 81 MG EC tablet                Protect others around you: Learn how to safely use, store and throw away your medicines at www.disposemymeds.org.             Medication List: This is a list of all your medications and when to take them. Check marks below indicate your daily home schedule. Keep this list as a reference.      Medications           Morning Afternoon Evening Bedtime As Needed    aspirin 81 MG EC tablet   Take 1 tablet (81 mg) by mouth daily   Last time this was given:  81 mg on 11/8/2017  8:06 AM   Next Dose Due:  11/9/17 AM                                      atorvastatin 40 MG tablet    Commonly known as:  LIPITOR   Take 1 tablet (40 mg) by mouth daily   Last time this was given:  40 mg on 11/8/2017  8:06 AM   Next Dose Due:  11/9/17 bedtime                                      metoprolol 25 MG tablet   Commonly known as:  LOPRESSOR   Take 1 tablet (25 mg) by mouth 2 times daily   Last time this was given:  25 mg on 11/8/2017  8:06 AM   Next Dose Due:  11/8/17 bedtime                                        nitroGLYcerin 0.4 MG sublingual tablet   Commonly known as:  NITROSTAT   For chest pain place 1 tablet under the tongue every 5 minutes for 3 doses. If symptoms persist 5 minutes after 1st dose call 911.   Last time this was given:  0.4 mg on 11/6/2017  2:50 PM                                   ticagrelor 90 MG tablet   Commonly known as:  BRILINTA   Take 1 tablet (90 mg) by mouth every 12 hours   Last time this was given:  90 mg on 11/8/2017  8:06 AM   Next Dose Due:  11/8/17 bedtime                                                   More Information        Aspirin, ASA oral tablets  Brand Names: Aspir-Low, Aspirtab, Aspir-Suzanne, [employer] Advanced Aspirin, [employer] Aspirin, [employer] Aspirin Extra Strength, [employer] Aspirin Plus, Pat Extra Strength, Pat Extra Strength Plus, [employer] Genuine Aspirin, [employer] Womens Aspirin, Bufferin, Bufferin Extra Strength, Bufferin Low Dose  What is this medicine?  ASPIRIN (AS pir in) is a pain reliever. It is used to treat mild pain and fever. This medicine is also used as directed by a doctor to prevent and to treat heart attacks, to prevent strokes, and to treat arthritis or inflammation.  How should I use this medicine?  Take this medicine by mouth with a glass of water. Follow the directions on the package or prescription label. You can take this medicine with or without food. If it upsets your stomach, take it with food. Do not take your medicine more often than directed.  Talk to your pediatrician regarding the use of this medicine in children. While this drug may be  prescribed for children as young as 12 years of age for selected conditions, precautions do apply. Children and teenagers should not use this medicine to treat chicken pox or flu symptoms unless directed by a doctor.  Patients over 65 years old may have a stronger reaction and need a smaller dose.  What side effects may I notice from receiving this medicine?  Side effects that you should report to your doctor or health care professional as soon as possible:    allergic reactions like skin rash, itching or hives, swelling of the face, lips, or tongue    breathing problems    changes in hearing, ringing in the ears    confusion    general ill feeling or flu-like symptoms    pain on swallowing    redness, blistering, peeling or loosening of the skin, including inside the mouth or nose    signs and symptoms of bleeding such as bloody or black, tarry stools; red or dark-brown urine; spitting up blood or brown material that looks like coffee grounds; red spots on the skin; unusual bruising or bleeding from the eye, gums, or nose    trouble passing urine or change in the amount of urine    unusually weak or tired    yellowing of the eyes or skin  Side effects that usually do not require medical attention (report to your doctor or health care professional if they continue or are bothersome):    diarrhea or constipation    headache    nausea, vomiting    stomach gas, heartburn  What may interact with this medicine?  Do not take this medicine with any of the following medications:    cidofovir    ketorolac    probenecid  This medicine may also interact with the following medications:    alcohol    alendronate    bismuth subsalicylate    flavocoxid    herbal supplements like feverfew, garlic, alessandra, ginkgo biloba, horse chestnut    medicines for diabetes or glaucoma like acetazolamide, methazolamide    medicines for gout    medicines that treat or prevent blood clots like enoxaparin, heparin, ticlopidine, warfarin    other  aspirin and aspirin-like medicines    NSAIDs, medicines for pain and inflammation, like ibuprofen or naproxen    pemetrexed    sulfinpyrazone    varicella live vaccine  What if I miss a dose?  If you are taking this medicine on a regular schedule and miss a dose, take it as soon as you can. If it is almost time for your next dose, take only that dose. Do not take double or extra doses.  Where should I keep my medicine?  Keep out of the reach of children.  Store at room temperature between 15 and 30 degrees C (59 and 86 degrees F). Protect from heat and moisture. Do not use this medicine if it has a strong vinegar smell. Throw away any unused medicine after the expiration date.  What should I tell my health care provider before I take this medicine?  They need to know if you have any of these conditions:    anemia    asthma    bleeding problems    child with chickenpox, the flu, or other viral infection    diabetes    gout    if you frequently drink alcohol containing drinks    kidney disease    liver disease    low level of vitamin K    lupus    smoke tobacco    stomach ulcers or other problems    an unusual or allergic reaction to aspirin, tartrazine dye, other medicines, dyes, or preservatives    pregnant or trying to get pregnant    breast-feeding  What should I watch for while using this medicine?  If you are treating yourself for pain, tell your doctor or health care professional if the pain lasts more than 10 days, if it gets worse, or if there is a new or different kind of pain. Tell your doctor if you see redness or swelling. Also, check with your doctor if you have a fever that lasts for more than 3 days. Only take this medicine to prevent heart attacks or blood clotting if prescribed by your doctor or health care professional.  Do not take aspirin or aspirin-like medicines with this medicine. Too much aspirin can be dangerous. Always read the labels carefully.  This medicine can irritate your stomach or  cause bleeding problems. Do not smoke cigarettes or drink alcohol while taking this medicine. Do not lie down for 30 minutes after taking this medicine to prevent irritation to your throat.  If you are scheduled for any medical or dental procedure, tell your healthcare provider that you are taking this medicine. You may need to stop taking this medicine before the procedure.  This medicine may be used to treat migraines. If you take migraine medicines for 10 or more days a month, your migraines may get worse. Keep a diary of headache days and medicine use. Contact your healthcare professional if your migraine attacks occur more frequently.  NOTE:This sheet is a summary. It may not cover all possible information. If you have questions about this medicine, talk to your doctor, pharmacist, or health care provider. Copyright  2017 [employer]                Atorvastatin tablets  Brand Name: Lipitor  What is this medicine?  ATORVASTATIN (a TORE va sta tin) is known as a HMG-CoA reductase inhibitor or 'statin'. It lowers the level of cholesterol and triglycerides in the blood. This drug may also reduce the risk of heart attack, stroke, or other health problems in patients with risk factors for heart disease. Diet and lifestyle changes are often used with this drug.  How should I use this medicine?  Take this medicine by mouth with a glass of water. Follow the directions on the prescription label. You can take this medicine with or without food. Take your doses at regular intervals. Do not take your medicine more often than directed.  Talk to your pediatrician regarding the use of this medicine in children. While this drug may be prescribed for children as young as 10 years old for selected conditions, precautions do apply.  What side effects may I notice from receiving this medicine?  Side effects that you should report to your doctor or health care professional as soon as possible:    allergic reactions like skin rash,  itching or hives, swelling of the face, lips, or tongue    dark urine    fever    joint pain    muscle cramps, pain    redness, blistering, peeling or loosening of the skin, including inside the mouth    trouble passing urine or change in the amount of urine    unusually weak or tired    yellowing of eyes or skin  Side effects that usually do not require medical attention (report to your doctor or health care professional if they continue or are bothersome):    constipation    heartburn    stomach gas, pain, upset  What may interact with this medicine?  Do not take this medicine with any of the following medications:    red yeast rice    telaprevir    telithromycin    voriconazole  This medicine may also interact with the following medications:    alcohol    antiviral medicines for HIV or AIDS    boceprevir    certain antibiotics like clarithromycin, erythromycin, troleandomycin    certain medicines for cholesterol like fenofibrate or gemfibrozil    cimetidine    clarithromycin    colchicine    cyclosporine    digoxin    female hormones, like estrogens or progestins and birth control pills    grapefruit juice    medicines for fungal infections like fluconazole, itraconazole, ketoconazole    niacin    rifampin    spironolactone  What if I miss a dose?  If you miss a dose, take it as soon as you can. If it is almost time for your next dose, take only that dose. Do not take double or extra doses.  Where should I keep my medicine?  Keep out of the reach of children.  Store at room temperature between 20 to 25 degrees C (68 to 77 degrees F). Throw away any unused medicine after the expiration date.  What should I tell my health care provider before I take this medicine?  They need to know if you have any of these conditions:    frequently drink alcoholic beverages    history of stroke, TIA    kidney disease    liver disease    muscle aches or weakness    other medical condition    an unusual or allergic reaction to  atorvastatin, other medicines, foods, dyes, or preservatives    pregnant or trying to get pregnant    breast-feeding  What should I watch for while using this medicine?  Visit your doctor or health care professional for regular check-ups. You may need regular tests to make sure your liver is working properly.  Tell your doctor or health care professional right away if you get any unexplained muscle pain, tenderness, or weakness, especially if you also have a fever and tiredness. Your doctor or health care professional may tell you to stop taking this medicine if you develop muscle problems. If your muscle problems do not go away after stopping this medicine, contact your health care professional.  This drug is only part of a total heart-health program. Your doctor or a dietician can suggest a low-cholesterol and low-fat diet to help. Avoid alcohol and smoking, and keep a proper exercise schedule.  Do not use this drug if you are pregnant or breast-feeding. Serious side effects to an unborn child or to an infant are possible. Talk to your doctor or pharmacist for more information.  This medicine may affect blood sugar levels. If you have diabetes, check with your doctor or health care professional before you change your diet or the dose of your diabetic medicine.  If you are going to have surgery tell your health care professional that you are taking this drug.  NOTE:This sheet is a summary. It may not cover all possible information. If you have questions about this medicine, talk to your doctor, pharmacist, or health care provider. Copyright  2017 [employer]                Patient Education    Metoprolol Succinate Oral tablet, extended-release    Metoprolol Tartrate Oral tablet    Metoprolol Tartrate Solution for injection  Metoprolol Tartrate Oral tablet  What is this medicine?  METOPROLOL (me TOE proe lole) is a beta-blocker. Beta-blockers reduce the workload on the heart and help it to beat more regularly. This  medicine is used to treat high blood pressure and to prevent chest pain. It is also used to after a heart attack and to prevent an additional heart attack from occurring.  This medicine may be used for other purposes; ask your health care provider or pharmacist if you have questions.  What should I tell my health care provider before I take this medicine?  They need to know if you have any of these conditions:    diabetes    heart or vessel disease like slow heart rate, worsening heart failure, heart block, sick sinus syndrome or Raynaud's disease    kidney disease    liver disease    lung or breathing disease, like asthma or emphysema    pheochromocytoma    thyroid disease    an unusual or allergic reaction to metoprolol, other beta-blockers, medicines, foods, dyes, or preservatives    pregnant or trying to get pregnant    breast-feeding  How should I use this medicine?  Take this medicine by mouth with a drink of water. Follow the directions on the prescription label. Take this medicine immediately after meals. Take your doses at regular intervals. Do not take more medicine than directed. Do not stop taking this medicine suddenly. This could lead to serious heart-related effects.  Talk to your pediatrician regarding the use of this medicine in children. Special care may be needed.  Overdosage: If you think you have taken too much of this medicine contact a poison control center or emergency room at once.  NOTE: This medicine is only for you. Do not share this medicine with others.  What if I miss a dose?  If you miss a dose, take it as soon as you can. If it is almost time for your next dose, take only that dose. Do not take double or extra doses.  What may interact with this medicine?  This medicine may interact with the following medications:    certain medicines for blood pressure, heart disease, irregular heart beat    certain medicines for depression like monoamine oxidase (MAO) inhibitors, fluoxetine, or  paroxetine    clonidine    dobutamine    epinephrine    isoproterenol    reserpine  This list may not describe all possible interactions. Give your health care provider a list of all the medicines, herbs, non-prescription drugs, or dietary supplements you use. Also tell them if you smoke, drink alcohol, or use illegal drugs. Some items may interact with your medicine.  What should I watch for while using this medicine?  Visit your doctor or health care professional for regular check ups. Contact your doctor right away if your symptoms worsen. Check your blood pressure and pulse rate regularly. Ask your health care professional what your blood pressure and pulse rate should be, and when you should contact them.  You may get drowsy or dizzy. Do not drive, use machinery, or do anything that needs mental alertness until you know how this medicine affects you. Do not sit or stand up quickly, especially if you are an older patient. This reduces the risk of dizzy or fainting spells. Contact your doctor if these symptoms continue. Alcohol may interfere with the effect of this medicine. Avoid alcoholic drinks.  What side effects may I notice from receiving this medicine?  Side effects that you should report to your doctor or health care professional as soon as possible:    allergic reactions like skin rash, itching or hives    cold or numb hands or feet    depression    difficulty breathing    faint    fever with sore throat    irregular heartbeat, chest pain    rapid weight gain    swollen legs or ankles  Side effects that usually do not require medical attention (report to your doctor or health care professional if they continue or are bothersome):    anxiety or nervousness    change in sex drive or performance    dry skin    headache    nightmares or trouble sleeping    short term memory loss    stomach upset or diarrhea    unusually tired  This list may not describe all possible side effects. Call your doctor for medical  advice about side effects. You may report side effects to FDA at 2-558-FDA-1544.  Where should I keep my medicine?  Keep out of the reach of children.  Store at room temperature between 15 and 30 degrees C (59 and 86 degrees F). Throw away any unused medicine after the expiration date.  NOTE:This sheet is a summary. It may not cover all possible information. If you have questions about this medicine, talk to your doctor, pharmacist, or health care provider. Copyright  2016 Gold Standard                Patient Education    Nitroglycerin Oral capsule, extended-release    Nitroglycerin Oral tablet, extended-release    Nitroglycerin Rectal ointment    Nitroglycerin Solution for injection    Nitroglycerin Sublingual tablet    Nitroglycerin Sublingual/Translingual spray    Nitroglycerin Topical ointment    Nitroglycerin Transdermal patch - 24 hour    Nitroglycerin, Dextrose Solution for injection  Nitroglycerin Sublingual tablet  What is this medicine?  NITROGLYCERIN (matt troe GLI ser in) is a type of vasodilator. It relaxes blood vessels, increasing the blood and oxygen supply to your heart. This medicine is used to relieve chest pain caused by angina. It is also used to prevent chest pain before activities like climbing stairs, going outdoors in cold weather, or sexual activity.  This medicine may be used for other purposes; ask your health care provider or pharmacist if you have questions.  What should I tell my health care provider before I take this medicine?  They need to know if you have any of these conditions:    anemia    head injury, recent stroke, or bleeding in the brain    liver disease    previous heart attack    an unusual or allergic reaction to nitroglycerin, other medicines, foods, dyes, or preservatives    pregnant or trying to get pregnant    breast-feeding  How should I use this medicine?  Take this medicine by mouth as needed. At the first sign of an angina attack (chest pain or tightness) place one  tablet under your tongue. You can also take this medicine 5 to 10 minutes before an event likely to produce chest pain. Follow the directions on the prescription label. Let the tablet dissolve under the tongue. Do not swallow whole. Replace the dose if you accidentally swallow it. It will help if your mouth is not dry. Saliva around the tablet will help it to dissolve more quickly. Do not eat or drink, smoke or chew tobacco while a tablet is dissolving. If you are not better within 5 minutes after taking ONE dose of nitroglycerin, call 9-1-1 immediately to seek emergency medical care. Do not take more than 3 nitroglycerin tablets over 15 minutes.  If you take this medicine often to relieve symptoms of angina, your doctor or health care professional may provide you with different instructions to manage your symptoms. If symptoms do not go away after following these instructions, it is important to call 9-1-1 immediately. Do not take more than 3 nitroglycerin tablets over 15 minutes.  Talk to your pediatrician regarding the use of this medicine in children. Special care may be needed.  Overdosage: If you think you have taken too much of this medicine contact a poison control center or emergency room at once.  NOTE: This medicine is only for you. Do not share this medicine with others.  What if I miss a dose?  This does not apply. This medicine is only used as needed.  What may interact with this medicine?  Do not take this medicine with any of the following medications:    certain migraine medicines like ergotamine and dihydroergotamine (DHE)    medicines used to treat erectile dysfunction like sildenafil, tadalafil, and vardenafil    riociguat  This medicine may also interact with the following medications:    alteplase    aspirin    heparin    medicines for high blood pressure    medicines for mental depression    other medicines used to treat angina    phenothiazines like chlorpromazine, mesoridazine,  prochlorperazine, thioridazine  This list may not describe all possible interactions. Give your health care provider a list of all the medicines, herbs, non-prescription drugs, or dietary supplements you use. Also tell them if you smoke, drink alcohol, or use illegal drugs. Some items may interact with your medicine.  What should I watch for while using this medicine?  Tell your doctor or health care professional if you feel your medicine is no longer working.  Keep this medicine with you at all times. Sit or lie down when you take your medicine to prevent falling if you feel dizzy or faint after using it. Try to remain calm. This will help you to feel better faster. If you feel dizzy, take several deep breaths and lie down with your feet propped up, or bend forward with your head resting between your knees.  You may get drowsy or dizzy. Do not drive, use machinery, or do anything that needs mental alertness until you know how this drug affects you. Do not stand or sit up quickly, especially if you are an older patient. This reduces the risk of dizzy or fainting spells. Alcohol can make you more drowsy and dizzy. Avoid alcoholic drinks.  Do not treat yourself for coughs, colds, or pain while you are taking this medicine without asking your doctor or health care professional for advice. Some ingredients may increase your blood pressure.  What side effects may I notice from receiving this medicine?  Side effects that you should report to your doctor or health care professional as soon as possible:    blurred vision    dry mouth    skin rash    sweating    the feeling of extreme pressure in the head    unusually weak or tired  Side effects that usually do not require medical attention (report to your doctor or health care professional if they continue or are bothersome):    flushing of the face or neck    headache    irregular heartbeat, palpitations    nausea, vomiting  This list may not describe all possible side  effects. Call your doctor for medical advice about side effects. You may report side effects to FDA at 6-422-PJE-1568.  Where should I keep my medicine?  Keep out of the reach of children.  Store at room temperature between 20 and 25 degrees C (68 and 77 degrees F). Store in original container. Protect from light and moisture. Keep tightly closed. Throw away any unused medicine after the expiration date.  NOTE:This sheet is a summary. It may not cover all possible information. If you have questions about this medicine, talk to your doctor, pharmacist, or health care provider. Copyright  2016 Gold Standard                Ticagrelor Oral tablet  What is this medicine?  TICAGRELOR (CARMELO ka GREL or) helps to prevent blood clots. This medicine is used to prevent heart attack, stroke, or other vascular events in people who have had a recent heart attack or who have severe chest pain.  This medicine may be used for other purposes; ask your health care provider or pharmacist if you have questions.  What should I tell my health care provider before I take this medicine?  They need to know if you have any of these conditions:    bleeding disorder    bleeding in the brain    liver disease    planned surgery    stomach or intestinal ulcers    stroke or transient ischemic attack    an unusual or allergic reaction to ticagrelor, other medicines, foods, dyes, or preservatives    pregnant or trying to get pregnant    breast-feeding  How should I use this medicine?  Take this medicine by mouth with a glass of water. Follow the directions on the prescription label. You can take it with or without food. If it upsets your stomach, take it with food. Take your medicine at regular intervals. Do not take it more often than directed. Do not stop taking except on your doctor's advice.  Talk to you pediatrician regarding the use of this medicine in children. Special care may be needed.  Overdosage: If you think you've taken too much of this  medicine contact a poison control center or emergency room at once.  NOTE: This medicine is only for you. Do not share this medicine with others.  What if I miss a dose?  If you miss a dose, take it as soon as you can. If it is almost time for your next dose, take only that dose. Do not take double or extra doses.  What may interact with this medicine?    certain antibiotics like clarithromycin and telithromycin    certain medicines for fungal infections like itraconazole, ketoconazole, and voriconazole    certain medicines for HIV infection like atazanavir, indinavir, nelfinavir, ritonavir, and saquinavir    certain medicines for seizures like carbamazepine, phenobarbital, and phenytoin    certain medicines that treat or prevent blood clots like warfarin    dexamethasone    digoxin    lovastatin    nefazodone    rifampin    simvastatin  This list may not describe all possible interactions. Give your health care provider a list of all the medicines, herbs, non-prescription drugs, or dietary supplements you use. Also tell them if you smoke, drink alcohol, or use illegal drugs. Some items may interact with your medicine.  What should I watch for while using this medicine?  Visit your doctor or health care professional for regular check ups. Do not stop taking you medicine unless your doctor tells you to.  Notify your doctor or health care professional and seek emergency treatment if you develop breathing problems; changes in vision; chest pain; severe, sudden headache; pain, swelling, warmth in the leg; trouble speaking; sudden numbness or weakness of the face, arm, or leg. These can be signs that your condition has gotten worse.  If you are going to have surgery or dental work, tell your doctor or health care professional that you are taking this medicine.  You should take aspirin every day with this medicine. Do not take more than 100 mg each day. Talk to your doctor if you have questions.  What side effects may I  notice from receiving this medicine?  Side effects that you should report to your doctor or health care professional as soon as possible:    allergic reactions like skin rash, itching or hives, swelling of the face, lips, or tongue    breathing problems    fast or irregular heartbeat    feeling faint or light-headed, falls    signs and symptoms of bleeding such as bloody or black, tarry stools; red or dark-brown urine; spitting up blood or brown material that looks like coffee grounds; red spots on the skin; unusual bruising or bleeding from the eye, gums, or nose  Side effects that usually do not require medical attention (Report these to your doctor or health care professional if they continue or are bothersome.):    breast enlargement in both males and females    diarrhea    dizziness    headache    tiredness    upset stomach  This list may not describe all possible side effects. Call your doctor for medical advice about side effects. You may report side effects to FDA at 9-596-FDA-9686.  Where should I keep my medicine?  Keep out of the reach of children.  Store at room temperature of 59 to 86 degrees F (15 to 30 degrees C). Throw away any unused medicine after the expiration date.  NOTE: This sheet is a summary. It may not cover all possible information. If you have questions about this medicine, talk to your doctor, pharmacist, or health care provider.  NOTE:This sheet is a summary. It may not cover all possible information. If you have questions about this medicine, talk to your doctor, pharmacist, or health care provider. Copyright  2016 Gold Standard

## 2017-11-06 NOTE — ED PROVIDER NOTES
History     Chief Complaint:  Cardiac Arrest     HPI   Clark Canales is a 36 year old male who presents via EMS for evaluation of cardiac arrest. 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 Paramedics were contacted where they met patient, who was still responsive but in a substantial amount of pain. Patient reported a cough at time as well. He denied previous cardiac history. Patient was given ASA and NTG.  Enroute he went into  V fib.  Patient was shocked once after which he opened his eyes briefly. EMS noted ST elevation on their EKG.     Patient was brought in to the ED for emergent treatment.      Allergies:  No Known Allergies     Medications:    No current outpatient prescriptions on file.    Past Medical History:    No past medical history on file.    Past Surgical History:    No past surgical history on file.    Family History:    No family history on file.    Social History:  Smoking status: Not on File  Alcohol use: Not on File    Marital Status:  Single [1]     Review of Systems   Constitutional: Negative for fatigue.   Respiratory: Positive for chest tightness.    Cardiovascular: Positive for chest pain.   All other systems reviewed and are negative.        Physical Exam   First Vitals:  Patient Vitals for the past 24 hrs:   BP Temp Temp src Heart Rate Resp SpO2 Weight   11/06/17 1155 - - - - - - 102.5 kg (225 lb 15.5 oz)   11/06/17 1148 - 97  F (36.1  C) Temporal 69 - - -   11/06/17 1145 113/79 - - 85 18 98 % -       Physical Exam   Constitutional: The patient is lethargic, opens eyes with physical stimulation but does not answer questions.   HENT:   Head: Atraumatic  Right Ear: Normal  Left Ear: Normal  Nose: Nose normal.   Mouth/Throat: Oropharynx is clear and moist. No erythema or exudate.   Eyes: Conjunctivae and EOM are normal. Pupils are equal, round, and reactive to light. No discharge  Neck: Normal range of motion. Neck supple.   Cardiovascular: Normal  rate, regular rhythm, no murmur gallops or rubs. Intact distal pulses.    Pulmonary/Chest: CTA bilaterally. No wheezes rale or rhonchi.  Abdominal: Soft. Non tender.  No masses   Musculoskeletal: No edema. No bony deformity. Normal range of motion  Lymphadenopathy:     The patient has no cervical adenopathy.   Neurological: The patient is lethargic. Does not follow commands, moves all 4 extremities.  Face is symmetric  Skin: Skin is warm and dry. No rash noted. The patient is not diaphoretic.           Emergency Department Course     ECG (11:404:47):  Rate 72 bpm. UT interval 130. QRS duration 98. QT/QTc 362/396. P-R-T axes 75 79 20. Sinus rhythm with marked sinus arrhythmia with occasional premature ventricular complexes. Right atrial enlargement. Nonspecific ST and T wave abnormality. Abnormal EKG. Interpreted at 1143 by  Dr. Narayan.     Imaging:  Radiographic findings were communicated with the patient and Admitting MD who voiced understanding of the findings.    X-ray Chest Portable:  Monitoring device partially obscures the left upper and  lower medial hemithorax. Otherwise negative.   As read by Radiology.     Laboratory:  1140 - Troponin: 0.096 (H)  CBC: WBC 12.1 (H), o/w WNL (HGB 16.9, )    BMP: K 2.6 (LL), Glucose 151 (H), BUN 5 (L), Cr 1.31 (H), o/w WNL     Interventions:  1159 - Heparin 4,000 units IV    Emergency Department Course:  1136:The patient arrived in the emergency department via EMS minimally responsive on oxygen.  Patient arrives via EMS minimally responsive on oxygen. I performed an exam of the patient and obtained history, as documented above. GCS   1136: IV inserted and blood drawn.    1137 Pacer pads placed.     1137: Initial vitals - O2 sat 99%.     1138 Cath lab activated     1142 Dr. Chaudhry of Cardiology is at bedside.     1146: Patient underwent bedside X ray  1152: Heparin infusion was administered.    1157: Patient was subsequently transferred to cath lab.     Impression & Plan       CMS Diagnoses: The patient has a STEMI     The patient was evaluated at 11:36   Transferred to Cath lab due to presentation and ECG changes   ASA given by medic    Medical Decision Making:  Clark Canales is a 36 year old male who presents by EMS for cardiac arrest. Patient himself is unable to give me any history. Un exam, he is minimally, verbally responsive but was protecting hi airway. His EKG here appears to have normalised. EKG from the field did show ST elevations in leads 2,3 and AVF consistent with a myocardial infarction. In light of the report of his ventricular fibrillation and his ST elevation, I did feel that he needed emergent heart cath. Dr. Chaudhry did come to the Emergency department and I spoke with Dr Reilly on for interventional cardiology. Patient was given a 4000 unitbolus of Heparin. He had been given aspirin in the field prior to having his arrest. Was not awake enough to receive bryllinta at this time. Patient was taken directly to the cath lab.     Critical Care time:  30 minutes    Diagnosis:    ICD-10-CM    1. ST elevation myocardial infarction involving right coronary artery (H) I21.11 CARDIAC REHAB REFERRAL     Activated clotting time POCT     Activated clotting time POCT     Activated clotting time POCT     Activated clotting time POCT     Lipid Profile     Lipid Profile     CANCELED: Lipid panel   2. Cardiac arrest with ventricular fibrillation (H) I46.9 CARDIAC REHAB REFERRAL    I49.01        Disposition:  Admitted to the ICU    Discharge Medications:  There are no discharge medications for this patient.      Lizzeth Villaseñor  11/6/2017    EMERGENCY DEPARTMENT  I, Lizzeth Villaseñor, am serving as a scribe to document services personally performed by Dr. Ravindra Narayan, based on my observations and the provider's statements to me.         Xavier Narayan MD  11/06/17 8910       Xavier Narayan MD  11/14/17 9243

## 2017-11-06 NOTE — LETTER
Patient:  Clark Canales  :   1980  MRN:     [id number]      2017    Patient Name:  Clark Canales    He may return to work on 17    Patient Limitations:    Patient should not engage in activities involving lifting over 20 lbs. until 17. He may then return to normal activity and lifting.                 Stefania Blackmon, APRN, CNP        [id number]  1980

## 2017-11-06 NOTE — PLAN OF CARE
Problem: Patient Care Overview  Goal: Plan of Care/Patient Progress Review  Outcome: No Change  Received pt from cath lab with cangrelor infusing.  Pt is now alert and asking questions about what happened.  Brilanta loading dose given.  C/O chest pain substernal 5/10 with numbness in left forearm.  Pain relieved w Ntg SL; numbness persists.  Pt can move left arm and has a pulse 2+ and can grasp.  Has had 12 lead EKG. Dr Reilly here.  Pt and family are aware of POC.

## 2017-11-07 ENCOUNTER — APPOINTMENT (OUTPATIENT)
Dept: OCCUPATIONAL THERAPY | Facility: CLINIC | Age: 37
End: 2017-11-07
Attending: INTERNAL MEDICINE
Payer: MEDICAID

## 2017-11-07 ENCOUNTER — APPOINTMENT (OUTPATIENT)
Dept: CARDIOLOGY | Facility: CLINIC | Age: 37
End: 2017-11-07
Attending: INTERNAL MEDICINE
Payer: MEDICAID

## 2017-11-07 LAB
ANION GAP SERPL CALCULATED.3IONS-SCNC: 6 MMOL/L (ref 3–14)
BUN SERPL-MCNC: 5 MG/DL (ref 7–30)
CALCIUM SERPL-MCNC: 8.1 MG/DL (ref 8.5–10.1)
CHLORIDE SERPL-SCNC: 111 MMOL/L (ref 94–109)
CO2 SERPL-SCNC: 26 MMOL/L (ref 20–32)
CREAT SERPL-MCNC: 0.95 MG/DL (ref 0.66–1.25)
ERYTHROCYTE [DISTWIDTH] IN BLOOD BY AUTOMATED COUNT: 14.3 % (ref 10–15)
GFR SERPL CREATININE-BSD FRML MDRD: 89 ML/MIN/1.7M2
GLUCOSE SERPL-MCNC: 95 MG/DL (ref 70–99)
HCT VFR BLD AUTO: 43.1 % (ref 40–53)
HGB BLD-MCNC: 14.8 G/DL (ref 13.3–17.7)
KCT BLD-ACNC: 132 SEC (ref 105–167)
MAGNESIUM SERPL-MCNC: 2 MG/DL (ref 1.6–2.3)
MCH RBC QN AUTO: 29.4 PG (ref 26.5–33)
MCHC RBC AUTO-ENTMCNC: 34.3 G/DL (ref 31.5–36.5)
MCV RBC AUTO: 86 FL (ref 78–100)
PLATELET # BLD AUTO: 228 10E9/L (ref 150–450)
POTASSIUM SERPL-SCNC: 3.6 MMOL/L (ref 3.4–5.3)
RBC # BLD AUTO: 5.03 10E12/L (ref 4.4–5.9)
SODIUM SERPL-SCNC: 143 MMOL/L (ref 133–144)
WBC # BLD AUTO: 12.9 10E9/L (ref 4–11)

## 2017-11-07 PROCEDURE — 40000133 ZZH STATISTIC OT WARD VISIT

## 2017-11-07 PROCEDURE — 75561 CARDIAC MRI FOR MORPH W/DYE: CPT

## 2017-11-07 PROCEDURE — 83735 ASSAY OF MAGNESIUM: CPT | Performed by: INTERNAL MEDICINE

## 2017-11-07 PROCEDURE — 25000132 ZZH RX MED GY IP 250 OP 250 PS 637: Performed by: INTERNAL MEDICINE

## 2017-11-07 PROCEDURE — 25000128 H RX IP 250 OP 636: Performed by: INTERNAL MEDICINE

## 2017-11-07 PROCEDURE — 80048 BASIC METABOLIC PNL TOTAL CA: CPT | Performed by: INTERNAL MEDICINE

## 2017-11-07 PROCEDURE — 99233 SBSQ HOSP IP/OBS HIGH 50: CPT | Performed by: ANESTHESIOLOGY

## 2017-11-07 PROCEDURE — 36415 COLL VENOUS BLD VENIPUNCTURE: CPT | Performed by: INTERNAL MEDICINE

## 2017-11-07 PROCEDURE — 99233 SBSQ HOSP IP/OBS HIGH 50: CPT | Mod: 25 | Performed by: INTERNAL MEDICINE

## 2017-11-07 PROCEDURE — 21000001 ZZH R&B HEART CARE

## 2017-11-07 PROCEDURE — 75561 CARDIAC MRI FOR MORPH W/DYE: CPT | Mod: 26 | Performed by: INTERNAL MEDICINE

## 2017-11-07 PROCEDURE — 97110 THERAPEUTIC EXERCISES: CPT | Mod: GO

## 2017-11-07 PROCEDURE — A9585 GADOBUTROL INJECTION: HCPCS | Performed by: INTERNAL MEDICINE

## 2017-11-07 PROCEDURE — 85027 COMPLETE CBC AUTOMATED: CPT | Performed by: INTERNAL MEDICINE

## 2017-11-07 PROCEDURE — 97535 SELF CARE MNGMENT TRAINING: CPT | Mod: GO

## 2017-11-07 PROCEDURE — 97165 OT EVAL LOW COMPLEX 30 MIN: CPT | Mod: GO

## 2017-11-07 RX ORDER — DIAZEPAM 5 MG
5 TABLET ORAL EVERY 30 MIN PRN
Status: DISCONTINUED | OUTPATIENT
Start: 2017-11-07 | End: 2017-11-07

## 2017-11-07 RX ORDER — DIPHENHYDRAMINE HYDROCHLORIDE 50 MG/ML
25-50 INJECTION INTRAMUSCULAR; INTRAVENOUS
Status: DISCONTINUED | OUTPATIENT
Start: 2017-11-07 | End: 2017-11-07

## 2017-11-07 RX ORDER — ONDANSETRON 2 MG/ML
4 INJECTION INTRAMUSCULAR; INTRAVENOUS
Status: DISCONTINUED | OUTPATIENT
Start: 2017-11-07 | End: 2017-11-07

## 2017-11-07 RX ORDER — GADOBUTROL 604.72 MG/ML
5-65 INJECTION INTRAVENOUS ONCE
Status: COMPLETED | OUTPATIENT
Start: 2017-11-07 | End: 2017-11-07

## 2017-11-07 RX ORDER — ACYCLOVIR 200 MG/1
0-1 CAPSULE ORAL
Status: DISCONTINUED | OUTPATIENT
Start: 2017-11-07 | End: 2017-11-07

## 2017-11-07 RX ORDER — DIPHENHYDRAMINE HCL 25 MG
25 CAPSULE ORAL
Status: DISCONTINUED | OUTPATIENT
Start: 2017-11-07 | End: 2017-11-07

## 2017-11-07 RX ORDER — METHYLPREDNISOLONE SODIUM SUCCINATE 125 MG/2ML
125 INJECTION, POWDER, LYOPHILIZED, FOR SOLUTION INTRAMUSCULAR; INTRAVENOUS
Status: DISCONTINUED | OUTPATIENT
Start: 2017-11-07 | End: 2017-11-07

## 2017-11-07 RX ADMIN — GADOBUTROL 14 ML: 604.72 INJECTION INTRAVENOUS at 09:25

## 2017-11-07 RX ADMIN — ATORVASTATIN CALCIUM 40 MG: 40 TABLET, FILM COATED ORAL at 11:20

## 2017-11-07 RX ADMIN — METOPROLOL TARTRATE 25 MG: 25 TABLET ORAL at 20:10

## 2017-11-07 RX ADMIN — ASPIRIN 81 MG: 81 TABLET, COATED ORAL at 11:13

## 2017-11-07 RX ADMIN — TICAGRELOR 90 MG: 90 TABLET ORAL at 11:12

## 2017-11-07 RX ADMIN — TICAGRELOR 90 MG: 90 TABLET ORAL at 20:06

## 2017-11-07 NOTE — PLAN OF CARE
"Problem: Patient Care Overview  Goal: Plan of Care/Patient Progress Review  Outcome: Improving  Pt has no further c/o of chest pain or discomfort. Tolerating oral intake. VSS/UOP adequate. Sheath removed without difficulty. On bedrest until 2230. Pt requests to remove catheter at that time. Multiple supportive family and friends at bedside. Lengthy education done in regards to diet, smoking, caffeine intake. Pt smokes cigarettes, marijuana, drinks 1-2 red bulls per day. Discussed and educated about lipitor and lipid panel results. Pt is eager to make life necessary life changes as \"have my kids and a lot to live for.\" Very pleasant and eager to get better. Cont current plan of care.       "

## 2017-11-07 NOTE — PROGRESS NOTES
Cardiology Progress Note          Assessment and Plan:         1) Inferior STEMI s/p JOSIAS to RCA  2) VF arrest secondary to #1 above  3) Dyslipidemia  4) Tobacco abuse    PLAN  - CMR demonstrates normal overall LVEF with small RCA distribution scar  - on appropriate medical therapy  - short runs of NSVT probably related to reperfusion but he will need to be monitored for the next 24-48 hours  - cardiac rehab  - tobacco cessation advised        Interval History:     Resting comfortably.              Review of Systems:   As per subjective, otherwise 5 systems reviewed and negative.           Physical Exam:   Blood pressure 115/73, pulse 52, temperature 98.5  F (36.9  C), temperature source Oral, resp. rate 19, weight 105.9 kg (233 lb 7.5 oz), SpO2 97 %.      Vital Sign Ranges  Temperature Temp  Av.8  F (37.1  C)  Min: 98.1  F (36.7  C)  Max: 99.3  F (37.4  C)   Blood pressure Systolic (24hrs), Av , Min:101 , Max:150        Diastolic (24hrs), Av, Min:64, Max:103      Pulse Pulse  Av  Min: 52  Max: 52   Respirations Resp  Avg: 15  Min: 8  Max: 26   Pulse oximetry SpO2  Av.2 %  Min: 96 %  Max: 100 %         Intake/Output Summary (Last 24 hours) at 17 1315  Last data filed at 17 1100   Gross per 24 hour   Intake          1771.25 ml   Output             2325 ml   Net          -553.75 ml       Constitutional: NAD  Skin: Warm and dry  Neck: No JVD  Lungs: CTA  Cardiovascular: RRR, no m/r/g  Abdomen: Soft, non tender.  Extremities and Back: No LE edema  Neurological: Nonfocal           Medications:     I have reviewed this patient's current medications.              Data:     Labs reviewed.     Tele: NSR with 2 short runs of NSVT, asymptomatic        Julio Chaudhry M.D.

## 2017-11-07 NOTE — PROVIDER NOTIFICATION
Dr Conner notified of critical troponin. No further orders received as is an expected to result. Instructed to not notify unless pt is symptomatic or with EKG changes.

## 2017-11-07 NOTE — PROGRESS NOTES
11/07/17 1413   Quick Adds   Type of Visit Initial Occupational Therapy Evaluation   Living Environment   Lives With alone   Living Arrangements apartment   Home Accessibility stairs to enter home   Number of Stairs to Enter Home 6   Transportation Available car;family or friend will provide   Self-Care   Dominant Hand right   Usual Activity Tolerance excellent   Current Activity Tolerance moderate   Equipment Currently Used at Home none   Functional Level Prior   Ambulation 0-->independent   Transferring 0-->independent   Toileting 0-->independent   Bathing 0-->independent   Dressing 0-->independent   Eating 0-->independent   Communication 0-->understands/communicates without difficulty   Swallowing 0-->swallows foods/liquids without difficulty   Cognition 0 - no cognition issues reported   Fall history within last six months no   General Information   Onset of Illness/Injury or Date of Surgery - Date 11/06/17   Referring Physician Dr Sharma   Patient/Family Goals Statement Pt plans to return home today or tomorrow.    Additional Occupational Profile Info/Pertinent History of Current Problem Admitted STEMI resulting in cardiac arrest. Angio with JOSIAS to RCA.    Precautions/Limitations fall precautions  (lift precautions)   Heart Disease Risk Factors Smoking;Lack of physical activity;Stress;Family history;Gender;Race   Cognitive Status Examination   Orientation orientation to person, place and time   Level of Consciousness alert   Able to Follow Commands WNL/WFL   Personal Safety (Cognitive) at risk behaviors demonstrated   Memory intact   Pain Assessment   Patient Currently in Pain No   Transfer Skill: Sit to Stand   Level of [place]: Sit/Stand independent   Transfer Skill: Toilet Transfer   Level of [place]: Toilet independent   Lower Body Dressing   Level of [place]: Dress Lower Body independent   Toileting   Level of [place]: Toilet independent   Grooming   Level of [place]: Grooming  "independent   Eating/Self Feeding   Level of [place]: Eating independent   Activities of Daily Living Analysis   ADL Comments Decreased activity tolerance; Impaired safety; Lift precaution   General Therapy Interventions   Planned Therapy Interventions ADL retraining;home program guidelines;progressive activity/exercise;risk factor education   Clinical Impression   Criteria for Skilled Therapeutic Interventions Met yes, treatment indicated   OT Diagnosis Decreased I and safety with ADL/IADLs   Influenced by the following impairments Impaired safety; Decreased activity tolerance; Lift precaution   Assessment of Occupational Performance 1-3 Performance Deficits   Identified Performance Deficits IADLs   Clinical Decision Making (Complexity) Low complexity   Therapy Frequency daily   Predicted Duration of Therapy Intervention (days/wks) 3 days   Anticipated Discharge Disposition Home with Assist;Home with Outpatient Therapy   Risks and Benefits of Treatment have been explained. Yes   Patient, Family & other staff in agreement with plan of care Yes   [facility] TM \"6 Clicks\"   2016, Trustees of [facility], under license to [employer].  All rights reserved.   6 Clicks Short Forms Daily Activity Inpatient Short Form   [facility]  \"6 Clicks\" Daily Activity Inpatient Short Form   1. Putting on and taking off regular lower body clothing? 4 - None   2. Bathing (including washing, rinsing, drying)? 4 - None   3. Toileting, which includes using toilet, bedpan or urinal? 4 - None   4. Putting on and taking off regular upper body clothing? 4 - None   5. Taking care of personal grooming such as brushing teeth? 4 - None   6. Eating meals? 4 - None   Daily Activity Raw Score (Score out of 24.Lower scores equate to lower levels of function) 24   Total Evaluation Time   Total Evaluation Time (Minutes) 10     "

## 2017-11-07 NOTE — PLAN OF CARE
Problem: Patient Care Overview  Goal: Plan of Care/Patient Progress Review  Pt rested well t/o noc. PM metoprolol held r/t HR 52. Bradycardic t/o night with initial pac's and now pvc's. K 3.6/mg 2. No replacement protocol orders in place. Pt has had 2 self limiting runs of VT during sleep, 7/9 beats. BP stable. To have cardiac MRI today. Checklist complete. Cavazos dc'd and voiding without difficulty. Cont current plan of care.

## 2017-11-07 NOTE — PLAN OF CARE
Problem: Patient Care Overview  Goal: Plan of Care/Patient Progress Review  OT/CR: Eval complete and Tx initiated. Pt admitted for STEMI resulting in cardiac arrest. Angio with JOSIAS to RCA. Prior to admit, pt lives in apt with cousin and reports I with ADL/IADLs.      Discharge Planner OT   Patient plan for discharge: Home today or tomorrow     Current status: Pt ambulated for ~600 ft at a fast pace with stable CV response. Pt remained asymptomatic during exercise.     Barriers to return to prior living situation: 6 stairs to enter apt; Physically demanding job     Recommendations for discharge: Home with increased A from family/friends for IADLs as needed and OP CR     Rationale for recommendations: Pt limited by impaired safety, decreased activity tolerance, and lift precautions; thus, pt would benefit from daily OT/CR intervention to monitor exercise and further CAD education.        Entered by: David Kaufman 11/07/2017 2:50 PM

## 2017-11-07 NOTE — PLAN OF CARE
Problem: Patient Care Overview  Goal: Plan of Care/Patient Progress Review  Outcome: Improving  Denies chest pain or SOB.  Up ambulating in [place] w cardiac rehab.  Expecting pt to transfer to floor.  Dr Chaudhry called re: transfer.  Pt understands POC.

## 2017-11-07 NOTE — PROGRESS NOTES
Critical Care  Note      11/07/2017    Name: Clark Canales MRN#: [id number]   Age: 36 year old YOB: 1980     Hsptl Day# 1  ICU DAY #    MV DAY #             Problem List:   Active Problems:    Cardiac arrest (H)           Summary/Hospital Course:   37 yo male presents from cath lab after stenting of right RCA.  Had chest pain while at work followed by v fib arrest.  He is currently awake alert following commands.  Overnight he continued to do well, no complaints, episode of non sustained V tach.  Scheduled for cardiac MRI this am    ROS No SOB, Abdominal pain    Family history MI in uncle and grandfather late onset        Assessment and plan :     Clark Canales IS a 36 year old male admitted on 11/6/2017 for ACS s/p stenting of RCA.   I have personally reviewed the daily labs, imaging studies, cultures and discussed the case with referring physician and consulting physicians.     My assessment and plan by system for this patient is as follows:    Neurology/Psychiatry:   1. Concern for AMS  2. Analgesia  3. Anxiety  Plan  Improved, no interventions at this time    Cardiovascular:   1.Hemodynamics -stable  2.Rhythm - RRR  3. Ischemia - s/p stenting  Plan  Cont current cardiac medication regiment    Pulmonary/Ventilator Management:   1. Airway patent native airway  2. Oxygenation/ventilation/mechanics  Plan  Encourage Incentive spirometry    GI and Nutrition :   1. Advance diet    Plan  -advance diet as tolerated    Renal/Fluids/Electrolytes:   1. No evidence of ARSH  2. Electrolyte abnormality resolving hypokalemia  3. Acid/base status  4. Volume status euvolemic  Plan  - monitor I/O  - monitor function and electrolytes as needed with replacement per ICU protocols. - generally avoid nephrotoxic agents such as NSAID, IV contrast unless specifically required  - adjust medications as needed for renal clearance  - follow I/O's as appropriate.    Infectious Disease:   1.no active issues      Endocrine:   1.  Monitor blood sugar           IV/Access:   1. Venous access -    2. Arterial access -   3.  Plan  - lines removed without complications      ICU Prophylaxis:   1. DVT: SCD  2. VAP: not intubated  3. Stress Ulcer: not intubated  4. Restraints: Not indicated  5. Wound care  Local wound care at groin sites  6. Feeding - advance diet  7. Family Update:discussed with patient  8. Disposition - stable for transfer        Key goals for next 24 hours:   1.Cardiac MRI  2.Transfer from ICU               Medical History:     No past medical history on file.  No past surgical history on file.  Social History     Social History     Marital status: Single     Spouse name: N/A     Number of children: N/A     Years of education: N/A     Occupational History     Not on file.     Social History Main Topics     Smoking status: Not on file     Smokeless tobacco: Not on file     Alcohol use Not on file     Drug use: Not on file     Sexual activity: Not on file     Other Topics Concern     Not on file     Social History Narrative      No Known Allergies           Key Medications:       sodium chloride (PF)  3 mL Intracatheter Q8H     aspirin EC  81 mg Oral Daily     ticagrelor  90 mg Oral Q12H     metoprolol  25 mg Oral BID     atorvastatin  40 mg Oral Daily     influenza quadrivalent (PF) vacc age 3 yrs and older  0.5 mL Intramuscular Prior to discharge     pneumococcal vaccine  0.5 mL Intramuscular Prior to discharge       cangrelor (KENGREAL) infusion ADULT 4 mcg/kg/min (11/06/17 1235)     Reason ACE/ARB/ARNI order not selected          Home Meds    No current facility-administered medications on file prior to encounter.   No current outpatient prescriptions on file prior to encounter.           Physical Examination:   Temp:  [97  F (36.1  C)-99.3  F (37.4  C)] 98.3  F (36.8  C)  Pulse:  [52] 52  Heart Rate:  [50-93] 58  Resp:  [8-26] 15  BP: (101-150)/() 113/75  SpO2:  [96 %-100 %] 98 %    Intake/Output Summary (Last 24 hours)  at 11/06/17 1551  Last data filed at 11/06/17 1500   Gross per 24 hour   Intake           161.25 ml   Output              275 ml   Net          -113.75 ml     Wt Readings from Last 4 Encounters:   11/07/17 105.9 kg (233 lb 7.5 oz)     BP - Mean:  [] 89  Resp: 15  No lab results found in last 7 days.    GEN: no acute distress   HEENT: head ncat, sclera anicteric, OP patent, trachea midline   PULM:  clear anteriorly    CV/COR: RRR S1S2 no gallop,  No rub, no murmur  ABD: soft nontender, hypoactive bowel sounds, no mass  EXT:  Edema   Warm, line puncture site clean dry and intact  NEURO: grossly intact  SKIN: no obvious rash  LINES: clean, dry intact         Data:   All data and imaging reviewed     ROUTINE ICU LABS (Last four results)  CMP    Recent Labs  Lab 11/07/17  0445 11/06/17  1440 11/06/17  1140     --  141   POTASSIUM 3.6 4.1 2.6*   CHLORIDE 111*  --  106   CO2 26  --  21   ANIONGAP 6  --  14   GLC 95  --  151*   BUN 5*  --  5*   CR 0.95  --  1.31*   GFRESTIMATED 89  --  62   GFRESTBLACK >90  --  75   KAITLYNN 8.1*  --  8.6   MAG 2.0  --   --      CBC    Recent Labs  Lab 11/07/17  0445 11/06/17  1140   WBC 12.9* 12.1*   RBC 5.03 5.67   HGB 14.8 16.9   HCT 43.1 49.3   MCV 86 87   MCH 29.4 29.8   MCHC 34.3 34.3   RDW 14.3 14.5    265     INRNo lab results found in last 7 days.  Arterial Blood GasNo lab results found in last 7 days.    All cultures:  No results for input(s): CULT in the last 168 hours.  Recent Results (from the past 24 hour(s))   XR Chest Port 1 View    Narrative    XR CHEST PORT 1 VW  11/6/2017 11:54 AM    HISTORY:  cardiac arrest    COMPARISON:  None.      Impression    IMPRESSION:  Monitoring device partially obscures the left upper and  lower medial hemithorax. Otherwise negative.     BIB TINOCO MD         Billing: T

## 2017-11-08 ENCOUNTER — APPOINTMENT (OUTPATIENT)
Dept: OCCUPATIONAL THERAPY | Facility: CLINIC | Age: 37
End: 2017-11-08
Payer: MEDICAID

## 2017-11-08 VITALS
WEIGHT: 224.43 LBS | OXYGEN SATURATION: 99 % | RESPIRATION RATE: 16 BRPM | HEART RATE: 52 BPM | TEMPERATURE: 98.3 F | DIASTOLIC BLOOD PRESSURE: 48 MMHG | SYSTOLIC BLOOD PRESSURE: 108 MMHG

## 2017-11-08 LAB — MAGNESIUM SERPL-MCNC: 1.9 MG/DL (ref 1.6–2.3)

## 2017-11-08 PROCEDURE — 40000133 ZZH STATISTIC OT WARD VISIT

## 2017-11-08 PROCEDURE — 97110 THERAPEUTIC EXERCISES: CPT | Mod: GO

## 2017-11-08 PROCEDURE — 25000132 ZZH RX MED GY IP 250 OP 250 PS 637: Performed by: INTERNAL MEDICINE

## 2017-11-08 PROCEDURE — 97535 SELF CARE MNGMENT TRAINING: CPT | Mod: GO

## 2017-11-08 PROCEDURE — 99238 HOSP IP/OBS DSCHRG MGMT 30/<: CPT | Performed by: INTERNAL MEDICINE

## 2017-11-08 PROCEDURE — 83735 ASSAY OF MAGNESIUM: CPT | Performed by: INTERNAL MEDICINE

## 2017-11-08 PROCEDURE — 90686 IIV4 VACC NO PRSV 0.5 ML IM: CPT | Performed by: ANESTHESIOLOGY

## 2017-11-08 PROCEDURE — 25000128 H RX IP 250 OP 636: Performed by: ANESTHESIOLOGY

## 2017-11-08 PROCEDURE — 90732 PPSV23 VACC 2 YRS+ SUBQ/IM: CPT | Performed by: ANESTHESIOLOGY

## 2017-11-08 PROCEDURE — 36415 COLL VENOUS BLD VENIPUNCTURE: CPT | Performed by: INTERNAL MEDICINE

## 2017-11-08 RX ORDER — METOPROLOL TARTRATE 25 MG/1
25 TABLET, FILM COATED ORAL 2 TIMES DAILY
Qty: 60 TABLET | Refills: 11 | Status: SHIPPED | OUTPATIENT
Start: 2017-11-08

## 2017-11-08 RX ORDER — ATORVASTATIN CALCIUM 40 MG/1
40 TABLET, FILM COATED ORAL DAILY
Qty: 30 TABLET | Refills: 11 | Status: SHIPPED | OUTPATIENT
Start: 2017-11-08

## 2017-11-08 RX ORDER — NITROGLYCERIN 0.4 MG/1
TABLET SUBLINGUAL
Qty: 25 TABLET | Refills: 3 | Status: SHIPPED | OUTPATIENT
Start: 2017-11-08

## 2017-11-08 RX ADMIN — METOPROLOL TARTRATE 25 MG: 25 TABLET ORAL at 08:06

## 2017-11-08 RX ADMIN — ATORVASTATIN CALCIUM 40 MG: 40 TABLET, FILM COATED ORAL at 08:06

## 2017-11-08 RX ADMIN — ASPIRIN 81 MG: 81 TABLET, COATED ORAL at 08:06

## 2017-11-08 RX ADMIN — PNEUMOCOCCAL VACCINE POLYVALENT 0.5 ML
25; 25; 25; 25; 25; 25; 25; 25; 25; 25; 25; 25; 25; 25; 25; 25; 25; 25; 25; 25; 25; 25; 25 INJECTION, SOLUTION INTRAMUSCULAR; SUBCUTANEOUS at 10:47

## 2017-11-08 RX ADMIN — TICAGRELOR 90 MG: 90 TABLET ORAL at 08:06

## 2017-11-08 RX ADMIN — INFLUENZA A VIRUS A/MICHIGAN/45/2015 X-275 (H1N1) ANTIGEN (FORMALDEHYDE INACTIVATED), INFLUENZA A VIRUS A/HONG KONG/4801/2014 X-263B (H3N2) ANTIGEN (FORMALDEHYDE INACTIVATED), INFLUENZA B VIRUS B/PHUKET/3073/2013 ANTIGEN (FORMALDEHYDE INACTIVATED), AND INFLUENZA B VIRUS B/BRISBANE/60/2008 ANTIGEN (FORMALDEHYDE INACTIVATED) 0.5 ML: 15; 15; 15; 15 INJECTION, SUSPENSION INTRAMUSCULAR at 10:47

## 2017-11-08 NOTE — PHARMACY
Tried obtaining [employer] based on patient stating he has [employer], upon calling they advise me that the patients coverage ended on 8/31/17. I am advising the patient to call us back with the card information he has at home and we can rebill at that time. We will send him home with a trial card supply for the time being. All other medications will be for cash price until we receive valid ins information.    Thank you,  Lorenzo Chaudhry [facility] Discharge Pharmacy Liaison  [phone]

## 2017-11-08 NOTE — PLAN OF CARE
Problem: Patient Care Overview  Goal: Plan of Care/Patient Progress Review  Outcome: No Change  A/O, up independently in room. VSS on RA. Tele: SB/SR, HR 49-60's. Pt denies pain/SOB. Rt groin site ecchymotic, CMS intact. Plan for cardiac rehab.

## 2017-11-08 NOTE — DISCHARGE SUMMARY
[facility]    Discharge Summary  Cardiology    Date of Admission:  11/6/2017  Date of Discharge:  11/8/2017  Discharging Provider: CHINA Carlos CNP  Date of Service (when I saw the patient): 11/08/17    Discharge Diagnoses   Active Problems:    Cardiac arrest (H)      History of Present Illness   Clark Canales is a 36 year old male with past medical history significant for tobacco use, admitted on 11/6/2017 with chest discomfort and VF arrest requiring shock x 1 and regaining consciousness. Found to have inferior STEMI with JOSIAS x 1 placed to the pRCA for plaque rupture. He had NSVT during reperfusion but no further arrhythmia after that.     Hospital Course   Clark Canales was admitted on 11/6/2017.  The following problems were addressed during his hospitalization:    Summary:   1.  Inferior STEMI/ CAD    - Peak trop 15.5   - angio 11/6 with JOSIAS x 1 to pRCA for ruptured plaque, 25% mLAD  - CMR 11/7 normal EF with small RCA distribution scar  - No angina   - statin, ASA, brilinta, and BB       2. VF arrrest d/t STEMI  - required shock x 1 with regain of consciousness and SR    - short runs of NSVT (asymptomatic) r/t reperfusion   - no further arrhythmia      3. Hyperlipidemia  -     - continue atorvastatin      4. Tobacco use   - smoking cessation education         Plan:   1. IP cardiac rehab  2. Outpatient cardiac rehab   3. DAPT with ASA, brlinta, uninterrupted, for at least 1 year- needs copay card   4. Discharge today after rehab       Follow-up:   [facility] 11/16 1330                  Ali  with lipid, ALT no availability for Jan     Interval history: Did well with cardiac rehab yesterday. Denies CP or SOB, no HR or palpitations. No further arrhythmia on telemetry.         CHINA Carlos, CNP      Code Status   Full Code    Primary Care Physician   Physician No Ref-Primary    Physical Exam   Temp: 98  F (36.7  C) Temp src: Oral BP: 131/73   Heart Rate: 86 Resp: 16  SpO2: 99 % O2 Device: None (Room air)    Vitals:    11/06/17 1155 11/07/17 0400 11/08/17 0500   Weight: 102.5 kg (225 lb 15.5 oz) 105.9 kg (233 lb 7.5 oz) 101.8 kg (224 lb 6.9 oz)     Vital Signs with Ranges  Temp:  [98  F (36.7  C)-98.8  F (37.1  C)] 98  F (36.7  C)  Heart Rate:  [52-86] 86  Resp:  [12-27] 16  BP: (104-131)/(56-82) 131/73  SpO2:  [97 %-100 %] 99 %  I/O last 3 completed shifts:  In: 928 [P.O.:925; I.V.:3]  Out: 800 [Urine:800]    Constitutional: awake, alert, cooperative, no apparent distress, and appears stated age  Eyes:sclera clear, conjunctiva normal  ENT: Normocephalic, without obvious abnormality, atraumatic  Skin: warm and dry to touch   Respiratory: No increased work of breathing, good air exchange, clear to auscultation bilaterally, no crackles or wheezing  Cardiovascular: Normal apical impulse, regular rate and rhythm, normal S1 and S2, no S3 or S4, and no murmur noted, RFA with ecchymosis, small hematoma, no bleeding or bruit   Neurologic: Awake, alert, oriented to name, place and time.    Neuropsychiatric: General: normal, calm and normal eye contact  Level of consciousness: alert / normal  Affect: normal  Orientation: oriented to self, place, time and situation    Time Spent on this Encounter   IStefania, personally saw the patient today and spent less than or equal to 30 minutes discharging this patient.    Discharge Disposition   Discharged to home  Condition at discharge: Stable    Consultations This Hospital Stay   CARDIAC REHAB IP CONSULT  NUTRITION SERVICES ADULT IP CONSULT  PHARMACY IP CONSULT  PHARMACY IP CONSULT  CARDIAC REHAB IP CONSULT  CARDIAC REHAB IP CONSULT  SMOKING CESSATION PROGRAM IP CONSULT    Discharge Orders     ALT   Last Lab Result: No results found for: ALT     Lipid Profile     CARDIAC REHAB REFERRAL     Follow-Up with Cardiologist     Follow-Up with Cardiac Advanced Practice Provider       Discharge Medications   Current Discharge Medication List       START taking these medications    Details   aspirin EC 81 MG EC tablet Take 1 tablet (81 mg) by mouth daily    Associated Diagnoses: ST elevation myocardial infarction involving right coronary artery (H)      nitroGLYcerin (NITROSTAT) 0.4 MG sublingual tablet For chest pain place 1 tablet under the tongue every 5 minutes for 3 doses. If symptoms persist 5 minutes after 1st dose call 911.  Qty: 25 tablet, Refills: 3    Associated Diagnoses: ST elevation myocardial infarction involving right coronary artery (H)      atorvastatin (LIPITOR) 40 MG tablet Take 1 tablet (40 mg) by mouth daily  Qty: 30 tablet, Refills: 11    Associated Diagnoses: Hyperlipidemia LDL goal <70      metoprolol (LOPRESSOR) 25 MG tablet Take 1 tablet (25 mg) by mouth 2 times daily  Qty: 60 tablet, Refills: 11    Associated Diagnoses: Coronary artery disease involving native coronary artery of native heart without angina pectoris      ticagrelor (BRILINTA) 90 MG tablet Take 1 tablet (90 mg) by mouth every 12 hours  Qty: 60 tablet, Refills: 11    Associated Diagnoses: ST elevation myocardial infarction involving right coronary artery (H)           Allergies    No Known Allergies  Data   Most Recent 3 CBC's:  Recent Labs   Lab Test  11/07/17   0445  11/06/17   1140   WBC  12.9*  12.1*   HGB  14.8  16.9   MCV  86  87   PLT  228  265      Most Recent 3 BMP's:  Recent Labs   Lab Test  11/07/17   0445  11/06/17   1440  11/06/17   1140   NA  143   --   141   POTASSIUM  3.6  4.1  2.6*   CHLORIDE  111*   --   106   CO2  26   --   21   BUN  5*   --   5*   CR  0.95   --   1.31*   ANIONGAP  6   --   14   KAITLYNN  8.1*   --   8.6   GLC  95   --   151*     Most Recent 2 LFT's:No lab results found.  Most Recent INR's and Anticoagulation Dosing History:  Anticoagulation Dose History     There is no flowsheet data to display.        Most Recent 3 Troponin's:  Recent Labs   Lab Test  11/06/17   2146  11/06/17   1440  11/06/17   1140   TROPI  15.896*  1.668*   0.096*     Most Recent Cholesterol Panel:  Recent Labs   Lab Test  17   1140   CHOL  220*   LDL  137*   HDL  46   TRIG  185*     Most Recent 6 Bacteria Isolates From Any Culture (See EPIC Reports for Culture Details):No lab results found.  Most Recent TSH, T4 and A1c Labs:No lab results found.  Results for orders placed or performed during the hospital encounter of 17   XR Chest Port 1 View    Narrative    XR CHEST PORT 1 VW  2017 11:54 AM    HISTORY:  cardiac arrest    COMPARISON:  None.      Impression    IMPRESSION:  Monitoring device partially obscures the left upper and  lower medial hemithorax. Otherwise negative.     BIB [facility]OCO MD   MRI Cardiac w/contrast    Narrative                                                  [facility]                                                     CMR Report      MRN:                  [id number]                                  Name:            JOELLE MCKEON                                   :                  1980                                  Scan Date:   2017 07:46:15                                    Signed by Ck Smith (uid:13)  16:33:25.    SUMMARY   ==========================================================================================================    Clinical history: 36-year-old male with cardiac arrest, RCA STEMI.   Comparison CMR: None    1. The LV is normal in cavity size and wall thickness. The global systolic function is normal. The LVEF is  58%. There is hypokinesis of the basal to mid inferior segments. There is mild thickening of the mid  inferoseptal and inferior segments consistent with edema.     2. The RV is normal in cavity size. The global systolic function is normal. The RVEF is 57%.     3. Both atria are normal in size.    4. There is no significant valvular disease.     5. Late gadolinium enhancement imaging shows subendocardial hyperenhancement in the basal to mid inferior  and  inferoseptal segments consistent with a recent myocardial infarction in the RCA. There is also a small  area of hyperenhancement in the mid RV free wall consistent with a RV MI. There is approximately 80%  viability in the RCA and 100% viability in the LAD and LCx territories.     6. There is no intracardiac thrombus.    CONCLUSIONS: Normal LV and RV function, LVEF of 58% and RVEF of 57%. Moderate sized acute RCA MI including  small RV MI, with predominant viability.     CORE EXAM   ==========================================================================================================    MEASUREMENTS   ----------------------------------------------------------------------------------------      VOLUMETRIC ANALYSIS       ----------------------------------------------  .--------------------------------------------------------------.                    LV      Reference   RV      Reference    +------+-----------+--------+------------+--------+------------+   EDV   ml         135.89   (121-204)  144.07   (121-221)          ml/m^2       60.4   ()       64   ()     ESV   ml          56.42   (33-78)     62.65   (34-94)            ml/m^2       25.1   (18-39)      27.8   (18-47)      CO    L/min        4.77                4.89                      L/min/m^2     2.1                 2.2                MASS                                                       SV    ml          79.47   ()    81.42   ()           ml/m^2       35.3   (43-67)      36.2   (39-71)      EF    %           58.48   (57-75)     56.51   (50-76)     '------+-----------+--------+------------+--------+------------'            HEART RATE:  60       LV DIMENSIONS       ----------------------------------------------          WALL THICKNESS - ANTEROSEPTAL:  0.8 cm          WALL THICKNESS - INFEROLATERAL:  0.9 cm          LV LISA:  5 cm          LV ESD:  4.1  cm        LA DIMENSIONS (LV SYSTOLE)       ----------------------------------------------          DIAMETER:  3.3 cm        AORTIC ROOT DIMENSIONS       ----------------------------------------------          DIAMETER - SINUS OF VALSALVA:  3.1 cm          AORTIC ROOT SIZE:  Normal       ANATOMY   ----------------------------------------------------------------------------------------      LEFT VENTRICLE       ----------------------------------------------          WALL THICKNESS:  Normal           CAVITY SIZE:  Normal           MASS/THROMBUS:  None         RIGHT VENTRICLE       ----------------------------------------------          WALL THICKNESS:  Normal           CONTRACTILITY:  Normal           CAVITY SIZE:  Normal         LEFT ATRIUM       ----------------------------------------------          CAVITY SIZE:  Normal         RIGHT ATRIUM       ----------------------------------------------          CAVITY SIZE:  Normal         PERICARDIUM       ----------------------------------------------          Normal           EFFUSION:  None         PLEURAL EFFUSION       ----------------------------------------------   None       VALVES   ----------------------------------------------------------------------------------------      AORTIC VALVE       ----------------------------------------------          AORTIC VALVE LEAFLETS:  Trileaflet         MITRAL VALVE       ----------------------------------------------          MITRAL VALVE LEAFLETS:  Normal Leaflets         TRICUSPID VALVE       ----------------------------------------------          TRICUSPID VALVE LEAFLETS:  Normal Leaflets         PULMONIC VALVE       ----------------------------------------------          PULMONIC VALVE LEAFLETS:  Normal Leaflets       17 SEGMENT   ----------------------------------------------------------------------------------------  .-------------------------------------------------------------------------------------------.   Segments             Wall Motion    Hyperenhancement  Stress Perfusion  Interpretation   +--------------------+---------------+------------------+------------------+----------------+   Base Anterior       Normal/Hyper   None                                Normal            Base Anteroseptal   Normal/Hyper   None                                Normal            Base Inferoseptal   Normal/Hyper   1-25%                               Sub-Endo MI       Base Inferior       Mild/Mod Hypo  1-25%                               Sub-Endo MI       Base Inferolateral  Normal/Hyper   None                                Normal            Base Anterolateral  Normal/Hyper   None                                Normal            Mid Anterior        Normal/Hyper   None                                Normal            Mid Anteroseptal    Normal/Hyper   None                                Normal            Mid Inferoseptal    Mild/Mod Hypo  1-25%                               Sub-Endo MI       Mid Inferior        Severe Hypo    26-50%                              Sub-Endo MI       Mid Inferolateral   Normal/Hyper   None                                Normal            Mid Anterolateral   Normal/Hyper   None                                Normal            Apical Anterior     Normal/Hyper   None                                Normal            Apical Septal       Normal/Hyper   None                                Normal            Apical Inferior     Normal/Hyper   None                                Normal            Apical Lateral      Normal/Hyper   None                                Normal            [place]                Normal/Hyper   None                                Normal           +--------------------+---------------+------------------+------------------+----------------+   RV Segments         Wall Motion    Hyperenhancement   Stress Perfusion  Interpretation   +--------------------+---------------+------------------+------------------+----------------+   RV Basal Anterior   Normal/Hyper   None                                Normal            RV Basal Inferior   Normal/Hyper   None                                Normal            RV Mid              Normal/Hyper   None                                Normal            RV Apical           Normal/Hyper   None                                Normal           '--------------------+---------------+------------------+------------------+----------------'        FINDINGS       ----------------------------------------------          INFARCT/SCAR SIZE:  5 %      VASCULAR   ==========================================================================================================      SCAN INFO   ==========================================================================================================    GENERAL   ----------------------------------------------------------------------------------------      SEDATION       ----------------------------------------------          SEDATION USED?:  No         CONTRAST AGENT       ----------------------------------------------          TYPE:  Gadavist           VOLUME ADMINISTERED:  14 ml          DOSAGE FOR 0.5M:  0.07 mmol/kg          SERUM CREATININE:  0.95 sCr          GFR:  115.37 ml/min/1.73m^2          CREATININE DATE:  2017-11-07 00:00:00         VITALS       ----------------------------------------------          HEIGHT:  73 in          HEIGHT:  185.42 cm          BODY WEIGHT:  223 lbs          BODY WEIGHT:  101.15 kgs          BSA::  2.25 m^2        PULSE SEQUENCE       ----------------------------------------------          PULSE SEQUENCES:  Single-Shot SSFP, IR GRE - Segmented, IR SSFP - Single Shot, SSFP Cine         SETUP       ----------------------------------------------          TYPE:  Clinical           INPATIENT:   Yes           INCOMPLETE SCAN:  No           REASON(S) FOR SCAN:  Cardiomyopathy           ATTENDING PHYSICIAN:  VANESA FERRER

## 2017-11-08 NOTE — PLAN OF CARE
Problem: Patient Care Overview  Goal: Plan of Care/Patient Progress Review  Outcome: No Change  [place] [facility] and patient settled.

## 2017-11-08 NOTE — PLAN OF CARE
Problem: Cardiac: ACS (Acute Coronary Syndrome) (Adult)  Goal: Signs and Symptoms of Listed Potential Problems Will be Absent, Minimized or Managed (Cardiac: ACS)  Signs and symptoms of listed potential problems will be absent, minimized or managed by discharge/transition of care (reference Cardiac: ACS (Acute Coronary Syndrome) (Adult) CPG).   Outcome: Improving  VSS. Tele: SR/ SB. Right groin CDI. Did well in cardiac rehab. IV out and monitor off. Reviewed all discharge instructions, restrictions and medications. Meds were filled here and sent home with pt. Pt to make his own PCP appointment. Return to work note sent home with pt. Cardiac rehab will call to verify new time and date, phone number was provided to pt. Pt states that he has all of his belongings.

## 2017-11-08 NOTE — PROGRESS NOTES
Met with patient re: cardiology follow up appointment and establishing a PCP.   Provided patient with a [facility] brochure and he will establish a PCP on his own.  Outpatient Cardiac Rehab to contact patient to schedule visit at [facility].  Patient provided contact number is case does not hear from them.

## 2017-11-08 NOTE — CONSULTS
"BRIEF NUTRITION ASSESSMENT      REASON FOR ASSESSMENT:  Nutrition Admission Screen - \"Unintentional weight loss of 10# or more in past 2 months\"    Heart Healthy Diet Education        NUTRITION HISTORY:  Visited with pt this morning - \"I suppose I need to stop smoking cigarettes and marijuana\"  Pt leaves for work in the morning ~6am - \"drink a Red Bull on my way to work\"  Lunch is typically fast food  - \"if I have time for lunch - might stop at [employer] and get a hot dog or chili and nachos\"  Drinks regular COKE or Pepsi during the day and at night  He stops on his way home and gets take-out for dinner (diane cheeseburger, gyros, fried chicken) - \"this likely won't change right away\"  Pt only eats produce on the weekend - \"when I have my kids\"  He prepares meals when he has his kids on the weekend      Pt has a 3yo, 10yo, 10yo, 15yo (the 3yo and 10yo are his biological children, the 10yo and 15yo share his kids mother, but their father \"isn't in the picture\", so pt helps care for them)      CURRENT DIET AND INTAKE:  Diet:  2400mg Na, LSF              Pt tolerating po  Dislikes the food    ANTHROPOMETRICS:  Height: 6'1\"  Weight: (11/8) 101.8 kg  BMI: 29.5 kg/m2  IBW:  83.6 kg  Weight Status: Overweight BMI 25-29.9  %IBW: 122%  Weight History:   Wt Readings from Last 10 Encounters:   11/08/17 101.8 kg (224 lb 6.9 oz)     Per Care Everywhere:  1/31/17 230#  5/9/17  230#      LABS:  Labs noted    MALNUTRITION:  Patient does not meet two of the following criteria necessary for diagnosing malnutrition: significant weight loss, reduced intake, subcutaneous fat loss, muscle loss or fluid retention    NUTRITION DIAGNOSIS:  Food- and nutrition-related knowledge deficit R/t no prior diet teaching AEB this is a new dx    INTERVENTIONS:  Nutrition Prescription:    Recommended AHA Heart Healthy Diet    Implementation:     Nutrition Education (Content):  a) reviewed the Nutrition chapter in the  Understanding Artery Disease  " binder  b) reviewed AHA Heart Healthy Diet guidelines  c) provided additional Healthy Heart diet handouts - Mediterranean Diet    Nutrition Education (Application):  a) Discussed current eating habits and recommended alternative food choices (change to water vs soda, get [employer] for dinner takeout, eat produce daily)    Anticipate good compliance - pt very motivated to make changes    Diet Education - refer to Education flowsheet    Goals:    Patient verbalizes understanding of diet     All of the above goals met during education session    Follow Up/Monitoring:    Provided RD contact information for future questions    Pt states he will be going to OP CR - as part of the program, he will attend Nutrition classes

## 2017-11-08 NOTE — PLAN OF CARE
Discharge Planner OT   Patient plan for discharge: Home today or tomorrow     Current status: Pt completed 10 stairs with modified independence. Pt completed 14  minutes on the treadmill at a speed of 1.5 mph. Education on MI lifting restrictions, activity guidelines, HEP, and Op CR.      Barriers to return to prior living situation: Physically demanding job     Recommendations for discharge: Home with increased A from family/friends for IADLs as needed and OP CR     Rationale for recommendations: Pt limited by impaired safety, decreased activity tolerance, and lift precautions; thus, pt would benefit from daily OT/CR intervention to monitor exercise and further CAD education.        Entered by: Buddy Seaman 11/08/2017 9:40 AM

## 2017-11-09 LAB
INTERPRETATION ECG - MUSE: NORMAL
INTERPRETATION ECG - MUSE: NORMAL

## 2017-11-09 NOTE — PLAN OF CARE
Problem: Patient Care Overview  Goal: Plan of Care/Patient Progress Review  Occupational Therapy Discharge Summary     Reason for therapy discharge:    Discharged to home with outpatient therapy.     Progress towards therapy goal(s). See goals on Care Plan in [facility] electronic health record for goal details.  Goals not met.  Barriers to achieving goals:   discharge from facility.     Therapy recommendation(s):    Continued therapy is recommended.  Rationale/Recommendations:  To maximize exercise tolerance.

## 2017-11-10 ENCOUNTER — CARE COORDINATION (OUTPATIENT)
Dept: CARDIOLOGY | Facility: CLINIC | Age: 37
End: 2017-11-10

## 2017-11-10 NOTE — PROGRESS NOTES
"RN attempted to x1 to call patient at only phone number listed and received message stating that \"patient's phone does not accept incoming calls.\" RN unable to leave . RN called patient's father listed as contact and left  advising him to have patient call clinic to discuss f/u plan with cardiology.    Patient was evaluated and treated by cardiology for STEMI with vfib arrest resulting in JOSIAS x 1 placed to the pRCA for plaque rupture.    Patient scheduled for f/u apt on 11/16/17 with YODIT Brenda Sexton. Patient needs apt scheduled for OP cardiac rehab (orders in EPIC)  "

## 2018-12-25 NOTE — DISCHARGE INSTRUCTIONS
29-Mar-2018 Cardiac Angioplasty/Stent Discharge Instructions - Femoral    After you go home:      Have an adult stay with you until tomorrow.    Drink extra fluids for 2 days.    You may resume your normal diet.    No smoking       For 24 hours - due to the sedation you received:    Relax and take it easy.    Do NOT make any important or legal decisions.    Do NOT drive or operate machines at home or at work.    Do NOT drink alcohol.    Care of Groin Puncture Site:      For the first 24 hrs - check the puncture site every 1-2 hours while awake.    For 2 days, when you cough, sneeze, laugh or move your bowels, hold your hand over the puncture site and press firmly.    Remove the bandaid after 24 hours. If there is minor oozing, apply another bandaid and remove it after 12 hours.    It is normal to have a small bruise or pea size lump at the site.    You may shower tomorrow. Do NOT take a bath, or use a hot tub or pool for at least 3 days. Do NOT scrub the site. Do not use lotion or powder near the puncture site.     Activity:            For 2 days:    No stooping or squatting    Do NOT do any heavy activity such as exercise, lifting, or straining.     No housework, yard work or any activity that make you sweat    Do NOT lift more than 10 pounds    Bleeding:      If you start bleeding from the site in your groin, lie down flat and press firmly on/above the site for 10 minutes.     Once bleeding stops, lay flat for 2 hours.     Call [facility] as soon as you can.       Call 911 right away if you have heavy bleeding or bleeding that does not stop.      Medicines:      If you are taking antiplatelet medications such as Plavix, Brilinta or Effient, do not stop taking it until you talk to your cardiologist.        If you are on Metformin (Glucophage), do not restart it until you have blood tests (within 2 to 3 days after discharge).  After you have your blood drawn, you may restart the Metformin.     Take your medications, including  blood thinners, unless your provider tells you not to.  If you take Coumadin (Warfarin), have your INR checked by your provider in  3-5 days. Call your clinic to schedule this.    If you have stopped any medicines, check with your provider about when to restart them.    Follow Up Appointments:      Follow up with [facility] Nurse Practitioner at [facility] of patient preference in 7-10 days.    Cardiac Rehab will contact you for follow up care.    Call the clinic if:      You have increased pain or a large or growing hard lump around the site.    The site is red, swollen, hot or tender.    Blood or fluid is draining from the site.    You have chills or a fever greater than 101 F (38 C).    Your leg turns feels numb, cool or changes color.    You have hives, a rash or unusual itching.    New pain in the back or belly that you cannot control with Tylenol.    Any questions or concerns.      Other Instructions:      If you received a stent - carry your stent card with you at all times.      [facility] [place]:    [phone] [facility] (7 days a week)

## (undated) RX ORDER — VERAPAMIL HYDROCHLORIDE 2.5 MG/ML
INJECTION, SOLUTION INTRAVENOUS
Status: DISPENSED
Start: 2017-11-06

## (undated) RX ORDER — HEPARIN SODIUM 1000 [USP'U]/ML
INJECTION, SOLUTION INTRAVENOUS; SUBCUTANEOUS
Status: DISPENSED
Start: 2017-11-06

## (undated) RX ORDER — NITROGLYCERIN 5 MG/ML
VIAL (ML) INTRAVENOUS
Status: DISPENSED
Start: 2017-11-06

## (undated) RX ORDER — FENTANYL CITRATE 50 UG/ML
INJECTION, SOLUTION INTRAMUSCULAR; INTRAVENOUS
Status: DISPENSED
Start: 2017-11-06